# Patient Record
Sex: FEMALE | Race: WHITE | NOT HISPANIC OR LATINO | Employment: FULL TIME | URBAN - METROPOLITAN AREA
[De-identification: names, ages, dates, MRNs, and addresses within clinical notes are randomized per-mention and may not be internally consistent; named-entity substitution may affect disease eponyms.]

---

## 2019-05-25 ENCOUNTER — HOSPITAL ENCOUNTER (EMERGENCY)
Facility: HOSPITAL | Age: 64
Discharge: HOME | End: 2019-05-25
Attending: EMERGENCY MEDICINE | Admitting: EMERGENCY MEDICINE

## 2019-05-25 ENCOUNTER — APPOINTMENT (OUTPATIENT)
Dept: GENERAL RADIOLOGY | Facility: HOSPITAL | Age: 64
End: 2019-05-25

## 2019-05-25 VITALS
BODY MASS INDEX: 32.22 KG/M2 | TEMPERATURE: 99.3 F | HEIGHT: 68 IN | SYSTOLIC BLOOD PRESSURE: 228 MMHG | OXYGEN SATURATION: 95 % | DIASTOLIC BLOOD PRESSURE: 108 MMHG | RESPIRATION RATE: 16 BRPM | HEART RATE: 75 BPM | WEIGHT: 212.56 LBS

## 2019-05-25 DIAGNOSIS — M19.032 ARTHRITIS OF LEFT WRIST: Primary | ICD-10-CM

## 2019-05-25 LAB
ALBUMIN SERPL-MCNC: 4.2 G/DL (ref 3.5–5.2)
ALBUMIN/GLOB SERPL: 1.3 G/DL
ALP SERPL-CCNC: 78 U/L (ref 39–117)
ALT SERPL W P-5'-P-CCNC: 29 U/L (ref 1–33)
ANION GAP SERPL CALCULATED.3IONS-SCNC: 17.3 MMOL/L
AST SERPL-CCNC: 25 U/L (ref 1–32)
BASOPHILS # BLD AUTO: 0.07 10*3/MM3 (ref 0–0.2)
BASOPHILS NFR BLD AUTO: 0.5 % (ref 0–1.5)
BILIRUB SERPL-MCNC: 0.6 MG/DL (ref 0.2–1.2)
BUN BLD-MCNC: 13 MG/DL (ref 8–23)
BUN/CREAT SERPL: 17.8 (ref 7–25)
CALCIUM SPEC-SCNC: 9.8 MG/DL (ref 8.6–10.5)
CHLORIDE SERPL-SCNC: 97 MMOL/L (ref 98–107)
CO2 SERPL-SCNC: 24.7 MMOL/L (ref 22–29)
CREAT BLD-MCNC: 0.73 MG/DL (ref 0.57–1)
DEPRECATED RDW RBC AUTO: 46.2 FL (ref 37–54)
EOSINOPHIL # BLD AUTO: 0.06 10*3/MM3 (ref 0–0.4)
EOSINOPHIL NFR BLD AUTO: 0.4 % (ref 0.3–6.2)
ERYTHROCYTE [DISTWIDTH] IN BLOOD BY AUTOMATED COUNT: 13.2 % (ref 12.3–15.4)
ERYTHROCYTE [SEDIMENTATION RATE] IN BLOOD: 1 MM/HR (ref 0–30)
GFR SERPL CREATININE-BSD FRML MDRD: 80 ML/MIN/1.73
GLOBULIN UR ELPH-MCNC: 3.3 GM/DL
GLUCOSE BLD-MCNC: 121 MG/DL (ref 65–99)
HCT VFR BLD AUTO: 50 % (ref 34–46.6)
HGB BLD-MCNC: 16.3 G/DL (ref 12–15.9)
IMM GRANULOCYTES # BLD AUTO: 0.04 10*3/MM3 (ref 0–0.05)
IMM GRANULOCYTES NFR BLD AUTO: 0.3 % (ref 0–0.5)
LYMPHOCYTES # BLD AUTO: 2.47 10*3/MM3 (ref 0.7–3.1)
LYMPHOCYTES NFR BLD AUTO: 17.1 % (ref 19.6–45.3)
MCH RBC QN AUTO: 30.8 PG (ref 26.6–33)
MCHC RBC AUTO-ENTMCNC: 32.6 G/DL (ref 31.5–35.7)
MCV RBC AUTO: 94.3 FL (ref 79–97)
MONOCYTES # BLD AUTO: 1.04 10*3/MM3 (ref 0.1–0.9)
MONOCYTES NFR BLD AUTO: 7.2 % (ref 5–12)
NEUTROPHILS # BLD AUTO: 10.8 10*3/MM3 (ref 1.7–7)
NEUTROPHILS NFR BLD AUTO: 74.5 % (ref 42.7–76)
NRBC BLD AUTO-RTO: 0 /100 WBC (ref 0–0.2)
PLATELET # BLD AUTO: 300 10*3/MM3 (ref 140–450)
PMV BLD AUTO: 9.7 FL (ref 6–12)
POTASSIUM BLD-SCNC: 3.7 MMOL/L (ref 3.5–5.2)
PROT SERPL-MCNC: 7.5 G/DL (ref 6–8.5)
RBC # BLD AUTO: 5.3 10*6/MM3 (ref 3.77–5.28)
SODIUM BLD-SCNC: 139 MMOL/L (ref 136–145)
URATE SERPL-MCNC: 4.8 MG/DL (ref 2.4–5.7)
WBC NRBC COR # BLD: 14.48 10*3/MM3 (ref 3.4–10.8)

## 2019-05-25 PROCEDURE — 85652 RBC SED RATE AUTOMATED: CPT | Performed by: PHYSICIAN ASSISTANT

## 2019-05-25 PROCEDURE — 99283 EMERGENCY DEPT VISIT LOW MDM: CPT

## 2019-05-25 PROCEDURE — 73110 X-RAY EXAM OF WRIST: CPT

## 2019-05-25 PROCEDURE — 84550 ASSAY OF BLOOD/URIC ACID: CPT | Performed by: PHYSICIAN ASSISTANT

## 2019-05-25 PROCEDURE — 96374 THER/PROPH/DIAG INJ IV PUSH: CPT

## 2019-05-25 PROCEDURE — 80053 COMPREHEN METABOLIC PANEL: CPT | Performed by: PHYSICIAN ASSISTANT

## 2019-05-25 PROCEDURE — 85025 COMPLETE CBC W/AUTO DIFF WBC: CPT | Performed by: PHYSICIAN ASSISTANT

## 2019-05-25 RX ORDER — PREDNISONE 20 MG/1
20 TABLET ORAL 2 TIMES DAILY
Qty: 10 TABLET | Refills: 0 | Status: SHIPPED | OUTPATIENT
Start: 2019-05-25 | End: 2021-09-02

## 2019-05-25 RX ORDER — ONDANSETRON 4 MG/1
4 TABLET, FILM COATED ORAL EVERY 8 HOURS PRN
Qty: 15 TABLET | Refills: 0 | Status: SHIPPED | OUTPATIENT
Start: 2019-05-25 | End: 2021-09-02

## 2019-05-25 RX ORDER — CEPHALEXIN 500 MG/1
500 CAPSULE ORAL 4 TIMES DAILY
Qty: 40 CAPSULE | Refills: 0 | Status: SHIPPED | OUTPATIENT
Start: 2019-05-25 | End: 2021-09-02

## 2019-05-25 RX ORDER — SODIUM CHLORIDE 0.9 % (FLUSH) 0.9 %
10 SYRINGE (ML) INJECTION AS NEEDED
Status: DISCONTINUED | OUTPATIENT
Start: 2019-05-25 | End: 2019-05-25 | Stop reason: HOSPADM

## 2019-05-25 RX ORDER — HYDROCODONE BITARTRATE AND ACETAMINOPHEN 5; 325 MG/1; MG/1
1 TABLET ORAL EVERY 6 HOURS PRN
Qty: 15 TABLET | Refills: 0 | Status: SHIPPED | OUTPATIENT
Start: 2019-05-25 | End: 2021-09-02

## 2019-05-25 RX ADMIN — METOPROLOL TARTRATE 5 MG: 5 INJECTION INTRAVENOUS at 12:52

## 2021-06-29 ENCOUNTER — OFFICE VISIT (OUTPATIENT)
Dept: ORTHOPEDIC SURGERY | Facility: CLINIC | Age: 66
End: 2021-06-29

## 2021-06-29 VITALS — WEIGHT: 200 LBS | HEIGHT: 68 IN | TEMPERATURE: 96.8 F | BODY MASS INDEX: 30.31 KG/M2

## 2021-06-29 DIAGNOSIS — M16.11 PRIMARY OSTEOARTHRITIS OF RIGHT HIP: Primary | ICD-10-CM

## 2021-06-29 PROCEDURE — 99204 OFFICE O/P NEW MOD 45 MIN: CPT | Performed by: ORTHOPAEDIC SURGERY

## 2021-06-29 RX ORDER — CHLORHEXIDINE GLUCONATE 500 MG/1
CLOTH TOPICAL 2 TIMES DAILY
Status: CANCELLED | OUTPATIENT
Start: 2021-06-29

## 2021-06-29 RX ORDER — FLUOXETINE HYDROCHLORIDE 20 MG/1
20 CAPSULE ORAL DAILY
COMMUNITY
Start: 2021-06-08

## 2021-06-29 RX ORDER — PREGABALIN 75 MG/1
150 CAPSULE ORAL ONCE
Status: CANCELLED | OUTPATIENT
Start: 2021-09-17 | End: 2021-06-29

## 2021-06-29 RX ORDER — CEFAZOLIN SODIUM 2 G/100ML
2 INJECTION, SOLUTION INTRAVENOUS ONCE
Status: CANCELLED | OUTPATIENT
Start: 2021-09-17 | End: 2021-06-29

## 2021-06-29 RX ORDER — MELOXICAM 15 MG/1
15 TABLET ORAL ONCE
Status: CANCELLED | OUTPATIENT
Start: 2021-09-17 | End: 2021-06-29

## 2021-06-29 NOTE — PROGRESS NOTES
"Patient: Fariba Gauthier  YOB: 1955 66 y.o. female  Medical Record Number: [id number]    Chief Complaints:   Chief Complaint   Patient presents with   • Right Hip - Initial Evaluation       History of Present Illness:Fariba Gauthier is a 66 y.o. female who presents with  Right hip and groin pain - severe stabbing pain - limits adls, cant walk more than a block due to pain. Has progressed markedly over the past year.    Allergies:   Allergies   Allergen Reactions   • Morphine And Related        Medications:   Current Outpatient Medications   Medication Sig Dispense Refill   • cephalexin (KEFLEX) 500 MG capsule Take 1 capsule by mouth 4 (Four) Times a Day. 40 capsule 0   • FLUoxetine (PROzac) 20 MG capsule      • HYDROcodone-acetaminophen (NORCO) 5-325 MG per tablet Take 1 tablet by mouth Every 6 (Six) Hours As Needed for Severe Pain . 15 tablet 0   • LISINOPRIL PO Take  by mouth.     • metoprolol succinate XL (TOPROL-XL) 25 MG 24 hr tablet Take 25 mg by mouth Daily.     • ondansetron (ZOFRAN) 4 MG tablet Take 1 tablet by mouth Every 8 (Eight) Hours As Needed for Nausea or Vomiting. 15 tablet 0   • predniSONE (DELTASONE) 20 MG tablet Take 1 tablet by mouth 2 (Two) Times a Day. 10 tablet 0     No current facility-administered medications for this visit.         The following portions of the patient's history were reviewed and updated as appropriate: allergies, current medications, past family history, past medical history, past social history, past surgical history and problem list.    Review of Systems:   A 14 point review of systems was performed. All systems negative except pertinent positives/negative listed in HPI above    Physical Exam:   Vitals:    06/29/21 0814   Temp: 96.8 °F (36 °C)   TempSrc: Temporal   Weight: 90.7 kg (200 lb)   Height: 172.7 cm (68\")       General: A and O x 3, ASA, NAD    SCLERA:    Normal    DENTITION:   Normal  Hip:  right    LEG ALIGNMENT:     Neutral        LEG LENGTH " DISCREPANCY   :    left longer by 0.5 cm    GAIT:     Antalgic    SKIN:     No abnormality    RANGE OF MOTION:     Limited by joint irritability    STRENGTH:     Limited by joint irratibility    DISTAL PULSES:    Paplable    DISTAL SENSATION :   Intact    LYMPHATICS:     No   lymphadenopathy    OTHER:          +   Stinchfeld test      -    log roll      -   Tenderness to palpation trochanteric bursa        Radiology:  Xrays 2views right hip  (AP bilateral hips, and lateral of the hip) taken at an [facility] institution were reviewed  demonstrating  advanced, end-satge osteoarthritis with bone on bone articluation, periarticular osteophytes, and subchondral cysts    Assessment/Plan: Right hip endstage OA.  Continuation of conservative management vs. BEHZAD discussed.  The patient wishes to proceed with total hip replacement.  At this point the patient has failed the full gamut of conservative treatment and stating complete understanding of the risks/benefits/ anternatives wishes to proceed with surgical treatment.    Risk and benefits of surgery were reviewed.  Including, but not limited to, blood clots, anesthesia risk, infection, leg length discrepancy, fracture, skin/leg numbness, failure of the implant, need for future surgeries, continued pain, hematoma, need for transfusion, and death, among others.  The patient understands and wishes to proceed.     The spectrum of treatment options were discussed with the patient in detail including both the nonoperative and operative treatment modalities and their respective risks and benefits.  After thorough discussion, the patient has elected to undergo surgical treatment.  The details of the surgical procedure were explained including the location of probable incisions and a description of the likely implants to be used.  Models and diagrams were used as educational resources. The patient understands the likely convalescence after surgery, as well as the rehabilitation  required.  We thoroughly discussed the risks, benefits, and alternatives to surgery.  The risks include but are not limited to the risk of infection, joint stiffness, blood clots (including DVT and/or pulmonary embolus along with the risk of death), neurologic and/or vascular injury, fracture, dislocation, nonunion, malunion, need for further surgery including hardware failure requiring revision, and continued pain.  It was explained that if tissue has been repaired or reconstructed, there is also a chance of failure which may require further management.  Following the completion of the discussion, the patient expressed understanding of this planned course of care, all their questions were answered and consent will be obtained preoperatively.    Operative Plan: Posterior approach Total Hip Replacement 23 HR STAY      Ronald Maldonado MD  6/29/2021

## 2021-08-16 ENCOUNTER — TELEPHONE (OUTPATIENT)
Dept: ORTHOPEDIC SURGERY | Facility: CLINIC | Age: 66
End: 2021-08-16

## 2021-08-27 ENCOUNTER — TRANSCRIBE ORDERS (OUTPATIENT)
Dept: PREADMISSION TESTING | Facility: HOSPITAL | Age: 66
End: 2021-08-27

## 2021-08-27 DIAGNOSIS — Z01.818 OTHER SPECIFIED PRE-OPERATIVE EXAMINATION: Primary | ICD-10-CM

## 2021-09-02 ENCOUNTER — PRE-ADMISSION TESTING (OUTPATIENT)
Dept: PREADMISSION TESTING | Facility: HOSPITAL | Age: 66
End: 2021-09-02

## 2021-09-02 VITALS
HEIGHT: 68 IN | HEART RATE: 93 BPM | OXYGEN SATURATION: 96 % | SYSTOLIC BLOOD PRESSURE: 138 MMHG | DIASTOLIC BLOOD PRESSURE: 92 MMHG | RESPIRATION RATE: 16 BRPM | WEIGHT: 200 LBS | TEMPERATURE: 97.8 F | BODY MASS INDEX: 30.31 KG/M2

## 2021-09-02 DIAGNOSIS — M16.11 PRIMARY OSTEOARTHRITIS OF RIGHT HIP: ICD-10-CM

## 2021-09-02 LAB
ANION GAP SERPL CALCULATED.3IONS-SCNC: 12.1 MMOL/L (ref 5–15)
BACTERIA UR QL AUTO: ABNORMAL /HPF
BILIRUB UR QL STRIP: NEGATIVE
BUN SERPL-MCNC: 17 MG/DL (ref 8–23)
BUN/CREAT SERPL: 21.5 (ref 7–25)
CALCIUM SPEC-SCNC: 9.5 MG/DL (ref 8.6–10.5)
CHLORIDE SERPL-SCNC: 101 MMOL/L (ref 98–107)
CLARITY UR: ABNORMAL
CO2 SERPL-SCNC: 25.9 MMOL/L (ref 22–29)
COLOR UR: ABNORMAL
CREAT SERPL-MCNC: 0.79 MG/DL (ref 0.57–1)
DEPRECATED RDW RBC AUTO: 45.8 FL (ref 37–54)
ERYTHROCYTE [DISTWIDTH] IN BLOOD BY AUTOMATED COUNT: 13.3 % (ref 12.3–15.4)
GFR SERPL CREATININE-BSD FRML MDRD: 73 ML/MIN/1.73
GLUCOSE SERPL-MCNC: 113 MG/DL (ref 65–99)
GLUCOSE UR STRIP-MCNC: NEGATIVE MG/DL
HCT VFR BLD AUTO: 51 % (ref 34–46.6)
HGB BLD-MCNC: 17.3 G/DL (ref 12–15.9)
HGB UR QL STRIP.AUTO: NEGATIVE
HYALINE CASTS UR QL AUTO: ABNORMAL /LPF
KETONES UR QL STRIP: ABNORMAL
LEUKOCYTE ESTERASE UR QL STRIP.AUTO: ABNORMAL
MCH RBC QN AUTO: 31.7 PG (ref 26.6–33)
MCHC RBC AUTO-ENTMCNC: 33.9 G/DL (ref 31.5–35.7)
MCV RBC AUTO: 93.6 FL (ref 79–97)
NITRITE UR QL STRIP: NEGATIVE
PH UR STRIP.AUTO: 5.5 [PH] (ref 5–8)
PLATELET # BLD AUTO: 399 10*3/MM3 (ref 140–450)
PMV BLD AUTO: 9.4 FL (ref 6–12)
POTASSIUM SERPL-SCNC: 3.6 MMOL/L (ref 3.5–5.2)
PROT UR QL STRIP: ABNORMAL
QT INTERVAL: 397 MS
RBC # BLD AUTO: 5.45 10*6/MM3 (ref 3.77–5.28)
RBC # UR: ABNORMAL /HPF
REF LAB TEST METHOD: ABNORMAL
SODIUM SERPL-SCNC: 139 MMOL/L (ref 136–145)
SP GR UR STRIP: 1.02 (ref 1–1.03)
SQUAMOUS #/AREA URNS HPF: ABNORMAL /HPF
UROBILINOGEN UR QL STRIP: ABNORMAL
WBC # BLD AUTO: 10.12 10*3/MM3 (ref 3.4–10.8)
WBC UR QL AUTO: ABNORMAL /HPF

## 2021-09-02 PROCEDURE — 80048 BASIC METABOLIC PNL TOTAL CA: CPT

## 2021-09-02 PROCEDURE — 93010 ELECTROCARDIOGRAM REPORT: CPT | Performed by: INTERNAL MEDICINE

## 2021-09-02 PROCEDURE — 81001 URINALYSIS AUTO W/SCOPE: CPT

## 2021-09-02 PROCEDURE — 93005 ELECTROCARDIOGRAM TRACING: CPT

## 2021-09-02 PROCEDURE — 36415 COLL VENOUS BLD VENIPUNCTURE: CPT

## 2021-09-02 PROCEDURE — 85027 COMPLETE CBC AUTOMATED: CPT

## 2021-09-02 RX ORDER — CHLORHEXIDINE GLUCONATE 500 MG/1
CLOTH TOPICAL 2 TIMES DAILY
Status: ACTIVE | OUTPATIENT
Start: 2021-09-02

## 2021-09-02 RX ORDER — CHLORAL HYDRATE 500 MG
CAPSULE ORAL
COMMUNITY
End: 2021-09-17 | Stop reason: HOSPADM

## 2021-09-02 RX ORDER — CETIRIZINE HYDROCHLORIDE 10 MG/1
10 TABLET ORAL DAILY
COMMUNITY

## 2021-09-02 RX ORDER — METOPROLOL SUCCINATE 100 MG/1
100 TABLET, EXTENDED RELEASE ORAL DAILY
COMMUNITY

## 2021-09-02 ASSESSMENT — HOOS JR
HOOS JR SCORE: 14
HOOS JR SCORE: 46.652

## 2021-09-02 NOTE — DISCHARGE INSTRUCTIONS

## 2021-09-09 ENCOUNTER — OFFICE VISIT (OUTPATIENT)
Dept: ORTHOPEDIC SURGERY | Facility: CLINIC | Age: 66
End: 2021-09-09

## 2021-09-09 VITALS
WEIGHT: 201.2 LBS | HEIGHT: 68 IN | BODY MASS INDEX: 30.49 KG/M2 | TEMPERATURE: 98 F | DIASTOLIC BLOOD PRESSURE: 94 MMHG | SYSTOLIC BLOOD PRESSURE: 142 MMHG

## 2021-09-09 DIAGNOSIS — R52 PAIN: Primary | ICD-10-CM

## 2021-09-09 PROCEDURE — 73502 X-RAY EXAM HIP UNI 2-3 VIEWS: CPT | Performed by: NURSE PRACTITIONER

## 2021-09-09 PROCEDURE — S0260 H&P FOR SURGERY: HCPCS | Performed by: NURSE PRACTITIONER

## 2021-09-09 RX ORDER — LISINOPRIL AND HYDROCHLOROTHIAZIDE 20; 12.5 MG/1; MG/1
TABLET ORAL
COMMUNITY
Start: 2021-07-07

## 2021-09-09 NOTE — H&P (VIEW-ONLY)
"Patient: Fariba Gauthier    Date of Admission: 9/17/2021    YOB: 1955    Medical Record Number: [id number]    Admitting Physician: Dr. Ronald Maldonado    Reason for Admission: End Stage Right Hip OA    History of Present Illness: 66 y.o. female presents with severe end stage hip osteoarthritis which has not been responsive to the full compliment of conservative measures. Despite conservative attempts, there is still severe, constant activity limiting hip pain. Given the severity of the pain, the patient has elected to proceed with hip replacement.    Allergies:   Allergies   Allergen Reactions   • Morphine And Related Anaphylaxis         Current Medications:  Home Medications:    Current Outpatient Medications on File Prior to Visit   Medication Sig   • cetirizine (zyrTEC) 10 MG tablet Take 10 mg by mouth Daily.   • Chlorhexidine Gluconate (HIBICLENS EX) Apply  topically. AS DIRECTED PREOP   • FLUoxetine (PROzac) 20 MG capsule Take 20 mg by mouth Daily.   • lisinopril 20 MG tablet 20 mg, hydroCHLOROthiazide 12.5 MG 12.5 mg Take 1 dose by mouth Daily.   • lisinopril-hydrochlorothiazide (PRINZIDE,ZESTORETIC) 20-12.5 MG per tablet    • metoprolol succinate XL (TOPROL-XL) 100 MG 24 hr tablet Take 100 mg by mouth Daily.   • mupirocin (BACTROBAN) 2 % ointment Apply  topically to the appropriate area as directed 3 (Three) Times a Day. AS DIRECTED PREOP   • Omega-3 Fatty Acids (fish oil) 1000 MG capsule capsule Take  by mouth Daily With Breakfast. PT TO STOP PER MD INSTRUCTION     Current Facility-Administered Medications on File Prior to Visit   Medication   • Chlorhexidine Gluconate Cloth 2 % pads     PRN Meds:.    PMH:  Past Medical History:   Diagnosis Date   • Anesthesia complication     PT WANTS EVERYONE TO KNOW SHE \"GETS MEAN\" AFTER   • Depression with anxiety    • Hypertension    • Seasonal allergies         PSURGH:  Past Surgical History:   Procedure Laterality Date   • ANKLE LIGAMENT RECONSTRUCTION     • " "COLONOSCOPY     • OVARIAN CYST REMOVAL         SocialHx:  Social History     Occupational History   • Not on file   Tobacco Use   • Smoking status: Current Every Day Smoker     Packs/day: 0.50     Types: Cigarettes   • Smokeless tobacco: Never Used   Vaping Use   • Vaping Use: Never used   Substance and Sexual Activity   • Alcohol use: Yes     Alcohol/week: 8.0 standard drinks     Types: 8 Shots of liquor per week   • Drug use: Not Currently   • Sexual activity: Not on file      Social History     Social History Narrative   • Not on file       FamHx:  Family History   Problem Relation Age of Onset   • Malig Hyperthermia Neg Hx          Review of Systems:   A 14 point review of systems was performed, pertinent positives discussed above, all other systems are negative    Physical Exam: 66 y.o. female  Vital Signs :   Vitals:    09/09/21 1350   BP: 142/94   BP Location: Right arm   Patient Position: Sitting   Cuff Size: Adult   Temp: 98 °F (36.7 °C)   TempSrc: Temporal   Weight: 91.3 kg (201 lb 3.2 oz)   Height: 172.7 cm (68\")     General: Alert and Oriented x 3, No acute distress.  Psych: mood and affect appropriate; recent and remote memory intact  Eyes: conjunctiva clear; pupils equally round and reactive, sclera nonicteric  CV: no peripheral edema  Resp: normal respiratory effort  Skin: no rashes or wounds; normal turgor  Musculosketetal; pain with hip range of motion. Positive stinchfeld test. No trochanteric  Tenderness.  Vascular: palpable distal pulses    Labs:    Pre-Admission Testing on 09/02/2021   Component Date Value Ref Range Status   • Color, UA 09/02/2021 Dark Yellow* Yellow, Straw Final   • Appearance, UA 09/02/2021 Cloudy* Clear Final   • pH, UA 09/02/2021 5.5  5.0 - 8.0 Final   • Specific Gravity, UA 09/02/2021 1.019  1.005 - 1.030 Final   • Glucose, UA 09/02/2021 Negative  Negative Final   • Ketones, UA 09/02/2021 Trace* Negative Final   • Bilirubin, UA 09/02/2021 Negative  Negative Final   • Blood, " UA 09/02/2021 Negative  Negative Final   • Protein, UA 09/02/2021 100 mg/dL (2+)* Negative Final   • Leuk Esterase, UA 09/02/2021 Trace* Negative Final   • Nitrite, UA 09/02/2021 Negative  Negative Final   • Urobilinogen, UA 09/02/2021 1.0 E.U./dL  0.2 - 1.0 E.U./dL Final   • Glucose 09/02/2021 113* 65 - 99 mg/dL Final   • BUN 09/02/2021 17  8 - 23 mg/dL Final   • Creatinine 09/02/2021 0.79  0.57 - 1.00 mg/dL Final   • Sodium 09/02/2021 139  136 - 145 mmol/L Final   • Potassium 09/02/2021 3.6  3.5 - 5.2 mmol/L Final   • Chloride 09/02/2021 101  98 - 107 mmol/L Final   • CO2 09/02/2021 25.9  22.0 - 29.0 mmol/L Final   • Calcium 09/02/2021 9.5  8.6 - 10.5 mg/dL Final   • eGFR Non  Amer 09/02/2021 73  >60 mL/min/1.73 Final   • BUN/Creatinine Ratio 09/02/2021 21.5  7.0 - 25.0 Final   • Anion Gap 09/02/2021 12.1  5.0 - 15.0 mmol/L Final   • WBC 09/02/2021 10.12  3.40 - 10.80 10*3/mm3 Final   • RBC 09/02/2021 5.45* 3.77 - 5.28 10*6/mm3 Final   • Hemoglobin 09/02/2021 17.3* 12.0 - 15.9 g/dL Final   • Hematocrit 09/02/2021 51.0* 34.0 - 46.6 % Final   • MCV 09/02/2021 93.6  79.0 - 97.0 fL Final   • MCH 09/02/2021 31.7  26.6 - 33.0 pg Final   • MCHC 09/02/2021 33.9  31.5 - 35.7 g/dL Final   • RDW 09/02/2021 13.3  12.3 - 15.4 % Final   • RDW-SD 09/02/2021 45.8  37.0 - 54.0 fl Final   • MPV 09/02/2021 9.4  6.0 - 12.0 fL Final   • Platelets 09/02/2021 399  140 - 450 10*3/mm3 Final   • QT Interval 09/02/2021 397  ms Final   • RBC, UA 09/02/2021 0-2  None Seen, 0-2 /HPF Final   • WBC, UA 09/02/2021 0-2  None Seen, 0-2 /HPF Final   • Bacteria, UA 09/02/2021 None Seen  None Seen /HPF Final   • Squamous Epithelial Cells, UA 09/02/2021 21-30* None Seen, 0-2 /HPF Final   • Hyaline Casts, UA 09/02/2021 0-2  None Seen /LPF Final   • Methodology 09/02/2021 Manual Light Microscopy   Final     Xrays:  Xrays AP pelvis and a lateral of the Right hip were ordered and reviewed demonstrating  End stage hip OA with bone on bone  articulation, subchondral cysts and periarticular osteophytes.    Assessment:  End-stage Right hip osteoarthritis. Conservative measures have failed.      Plan:  The plan is to proceed with Right Total Hip Replacement. The patient voiced understanding of the risks, benefits, and alternative forms of treatment that were discussed with Dr Maldonado at the time of scheduling.  Same day home health    Camila Reilly, APRN  9/9/2021

## 2021-09-09 NOTE — H&P
"Patient: Fariba Gauthier    Date of Admission: 9/17/2021    YOB: 1955    Medical Record Number: [id number]    Admitting Physician: Dr. Ronald Maldonado    Reason for Admission: End Stage Right Hip OA    History of Present Illness: 66 y.o. female presents with severe end stage hip osteoarthritis which has not been responsive to the full compliment of conservative measures. Despite conservative attempts, there is still severe, constant activity limiting hip pain. Given the severity of the pain, the patient has elected to proceed with hip replacement.    Allergies:   Allergies   Allergen Reactions   • Morphine And Related Anaphylaxis         Current Medications:  Home Medications:    Current Outpatient Medications on File Prior to Visit   Medication Sig   • cetirizine (zyrTEC) 10 MG tablet Take 10 mg by mouth Daily.   • Chlorhexidine Gluconate (HIBICLENS EX) Apply  topically. AS DIRECTED PREOP   • FLUoxetine (PROzac) 20 MG capsule Take 20 mg by mouth Daily.   • lisinopril 20 MG tablet 20 mg, hydroCHLOROthiazide 12.5 MG 12.5 mg Take 1 dose by mouth Daily.   • lisinopril-hydrochlorothiazide (PRINZIDE,ZESTORETIC) 20-12.5 MG per tablet    • metoprolol succinate XL (TOPROL-XL) 100 MG 24 hr tablet Take 100 mg by mouth Daily.   • mupirocin (BACTROBAN) 2 % ointment Apply  topically to the appropriate area as directed 3 (Three) Times a Day. AS DIRECTED PREOP   • Omega-3 Fatty Acids (fish oil) 1000 MG capsule capsule Take  by mouth Daily With Breakfast. PT TO STOP PER MD INSTRUCTION     Current Facility-Administered Medications on File Prior to Visit   Medication   • Chlorhexidine Gluconate Cloth 2 % pads     PRN Meds:.    PMH:  Past Medical History:   Diagnosis Date   • Anesthesia complication     PT WANTS EVERYONE TO KNOW SHE \"GETS MEAN\" AFTER   • Depression with anxiety    • Hypertension    • Seasonal allergies         PSURGH:  Past Surgical History:   Procedure Laterality Date   • ANKLE LIGAMENT RECONSTRUCTION     • " "COLONOSCOPY     • OVARIAN CYST REMOVAL         SocialHx:  Social History     Occupational History   • Not on file   Tobacco Use   • Smoking status: Current Every Day Smoker     Packs/day: 0.50     Types: Cigarettes   • Smokeless tobacco: Never Used   Vaping Use   • Vaping Use: Never used   Substance and Sexual Activity   • Alcohol use: Yes     Alcohol/week: 8.0 standard drinks     Types: 8 Shots of liquor per week   • Drug use: Not Currently   • Sexual activity: Not on file      Social History     Social History Narrative   • Not on file       FamHx:  Family History   Problem Relation Age of Onset   • Malig Hyperthermia Neg Hx          Review of Systems:   A 14 point review of systems was performed, pertinent positives discussed above, all other systems are negative    Physical Exam: 66 y.o. female  Vital Signs :   Vitals:    09/09/21 1350   BP: 142/94   BP Location: Right arm   Patient Position: Sitting   Cuff Size: Adult   Temp: 98 °F (36.7 °C)   TempSrc: Temporal   Weight: 91.3 kg (201 lb 3.2 oz)   Height: 172.7 cm (68\")     General: Alert and Oriented x 3, No acute distress.  Psych: mood and affect appropriate; recent and remote memory intact  Eyes: conjunctiva clear; pupils equally round and reactive, sclera nonicteric  CV: no peripheral edema  Resp: normal respiratory effort  Skin: no rashes or wounds; normal turgor  Musculosketetal; pain with hip range of motion. Positive stinchfeld test. No trochanteric  Tenderness.  Vascular: palpable distal pulses    Labs:    Pre-Admission Testing on 09/02/2021   Component Date Value Ref Range Status   • Color, UA 09/02/2021 Dark Yellow* Yellow, Straw Final   • Appearance, UA 09/02/2021 Cloudy* Clear Final   • pH, UA 09/02/2021 5.5  5.0 - 8.0 Final   • Specific Gravity, UA 09/02/2021 1.019  1.005 - 1.030 Final   • Glucose, UA 09/02/2021 Negative  Negative Final   • Ketones, UA 09/02/2021 Trace* Negative Final   • Bilirubin, UA 09/02/2021 Negative  Negative Final   • Blood, " UA 09/02/2021 Negative  Negative Final   • Protein, UA 09/02/2021 100 mg/dL (2+)* Negative Final   • Leuk Esterase, UA 09/02/2021 Trace* Negative Final   • Nitrite, UA 09/02/2021 Negative  Negative Final   • Urobilinogen, UA 09/02/2021 1.0 E.U./dL  0.2 - 1.0 E.U./dL Final   • Glucose 09/02/2021 113* 65 - 99 mg/dL Final   • BUN 09/02/2021 17  8 - 23 mg/dL Final   • Creatinine 09/02/2021 0.79  0.57 - 1.00 mg/dL Final   • Sodium 09/02/2021 139  136 - 145 mmol/L Final   • Potassium 09/02/2021 3.6  3.5 - 5.2 mmol/L Final   • Chloride 09/02/2021 101  98 - 107 mmol/L Final   • CO2 09/02/2021 25.9  22.0 - 29.0 mmol/L Final   • Calcium 09/02/2021 9.5  8.6 - 10.5 mg/dL Final   • eGFR Non  Amer 09/02/2021 73  >60 mL/min/1.73 Final   • BUN/Creatinine Ratio 09/02/2021 21.5  7.0 - 25.0 Final   • Anion Gap 09/02/2021 12.1  5.0 - 15.0 mmol/L Final   • WBC 09/02/2021 10.12  3.40 - 10.80 10*3/mm3 Final   • RBC 09/02/2021 5.45* 3.77 - 5.28 10*6/mm3 Final   • Hemoglobin 09/02/2021 17.3* 12.0 - 15.9 g/dL Final   • Hematocrit 09/02/2021 51.0* 34.0 - 46.6 % Final   • MCV 09/02/2021 93.6  79.0 - 97.0 fL Final   • MCH 09/02/2021 31.7  26.6 - 33.0 pg Final   • MCHC 09/02/2021 33.9  31.5 - 35.7 g/dL Final   • RDW 09/02/2021 13.3  12.3 - 15.4 % Final   • RDW-SD 09/02/2021 45.8  37.0 - 54.0 fl Final   • MPV 09/02/2021 9.4  6.0 - 12.0 fL Final   • Platelets 09/02/2021 399  140 - 450 10*3/mm3 Final   • QT Interval 09/02/2021 397  ms Final   • RBC, UA 09/02/2021 0-2  None Seen, 0-2 /HPF Final   • WBC, UA 09/02/2021 0-2  None Seen, 0-2 /HPF Final   • Bacteria, UA 09/02/2021 None Seen  None Seen /HPF Final   • Squamous Epithelial Cells, UA 09/02/2021 21-30* None Seen, 0-2 /HPF Final   • Hyaline Casts, UA 09/02/2021 0-2  None Seen /LPF Final   • Methodology 09/02/2021 Manual Light Microscopy   Final     Xrays:  Xrays AP pelvis and a lateral of the Right hip were ordered and reviewed demonstrating  End stage hip OA with bone on bone  articulation, subchondral cysts and periarticular osteophytes.    Assessment:  End-stage Right hip osteoarthritis. Conservative measures have failed.      Plan:  The plan is to proceed with Right Total Hip Replacement. The patient voiced understanding of the risks, benefits, and alternative forms of treatment that were discussed with Dr Maldonado at the time of scheduling.  Same day home health    Camila Reilly, APRN  9/9/2021

## 2021-09-13 ENCOUNTER — TELEPHONE (OUTPATIENT)
Dept: ORTHOPEDIC SURGERY | Facility: HOSPITAL | Age: 66
End: 2021-09-13

## 2021-09-13 NOTE — TELEPHONE ENCOUNTER
Pain Risk:  ? Currently on narcotics  ? ETOH > 3 drinks/day  ? Pain management patient  ? Current cannaboid use  No issues  Cardiac-Neuro Risk:  ? Arrhythmias  ? Stent/MI  ? Pacer  ? Heart Failure  ? Stroke with residual Choose an item.  htn  Respiratory Risk:  ? Sleep apnea  ? CPAP machine use  ? Nightly snoring  ? Asthma/COPD  Diabetic Risk:  HgA1C (within 90 days prior to PAT):  Click or tap here to enter text.  ? Insulin use  ? More than 1 diabetic medication  No issues  Urinary retention:  No  Caregiver 24-48hrs post-discharge: staying with brother    Home needs:  ? None/will need before discharge  ? Have walker and/or cane  ? Steps 1 step to get in the side door   Discharge Plan:  Home with      Prescriptions: Meds to bed    Home medications:   ? Blood thinner/anti-coag therapy  ? BPH or diuretic- Flomax  ? BP meds  Metoprolol, Lisinopril/hctz      Educate patient on spinal anesthesia/pain control:  ? patient verbalize understanding    Educate patient on hospital course/timeline:  ?  patient verbalize understanding    Joint Care Class:  ?  yes ? no

## 2021-09-15 ENCOUNTER — LAB (OUTPATIENT)
Dept: LAB | Facility: HOSPITAL | Age: 66
End: 2021-09-15

## 2021-09-15 DIAGNOSIS — Z01.818 OTHER SPECIFIED PRE-OPERATIVE EXAMINATION: ICD-10-CM

## 2021-09-15 LAB — SARS-COV-2 ORF1AB RESP QL NAA+PROBE: NOT DETECTED

## 2021-09-15 PROCEDURE — U0004 COV-19 TEST NON-CDC HGH THRU: HCPCS

## 2021-09-15 PROCEDURE — U0005 INFEC AGEN DETEC AMPLI PROBE: HCPCS

## 2021-09-15 PROCEDURE — C9803 HOPD COVID-19 SPEC COLLECT: HCPCS

## 2021-09-17 ENCOUNTER — ANESTHESIA (OUTPATIENT)
Dept: PERIOP | Facility: HOSPITAL | Age: 66
End: 2021-09-17

## 2021-09-17 ENCOUNTER — APPOINTMENT (OUTPATIENT)
Dept: GENERAL RADIOLOGY | Facility: HOSPITAL | Age: 66
End: 2021-09-17

## 2021-09-17 ENCOUNTER — HOSPITAL ENCOUNTER (OUTPATIENT)
Facility: HOSPITAL | Age: 66
Discharge: HOME HEALTH | End: 2021-09-17
Attending: ORTHOPAEDIC SURGERY | Admitting: ORTHOPAEDIC SURGERY

## 2021-09-17 ENCOUNTER — ANESTHESIA EVENT (OUTPATIENT)
Dept: PERIOP | Facility: HOSPITAL | Age: 66
End: 2021-09-17

## 2021-09-17 ENCOUNTER — HOME HEALTH ADMISSION (OUTPATIENT)
Dept: HOME HEALTH SERVICES | Facility: HOME HEALTHCARE | Age: 66
End: 2021-09-17

## 2021-09-17 VITALS
HEIGHT: 68 IN | DIASTOLIC BLOOD PRESSURE: 89 MMHG | SYSTOLIC BLOOD PRESSURE: 174 MMHG | OXYGEN SATURATION: 96 % | BODY MASS INDEX: 30.24 KG/M2 | HEART RATE: 74 BPM | TEMPERATURE: 97.6 F | WEIGHT: 199.52 LBS | RESPIRATION RATE: 16 BRPM

## 2021-09-17 DIAGNOSIS — M16.11 PRIMARY OSTEOARTHRITIS OF RIGHT HIP: ICD-10-CM

## 2021-09-17 DIAGNOSIS — Z98.890 STATUS POST HIP SURGERY: Primary | ICD-10-CM

## 2021-09-17 LAB
ABO GROUP BLD: NORMAL
BLD GP AB SCN SERPL QL: NEGATIVE
RH BLD: POSITIVE
T&S EXPIRATION DATE: NORMAL

## 2021-09-17 PROCEDURE — 25010000002 HYDRALAZINE PER 20 MG: Performed by: NURSE ANESTHETIST, CERTIFIED REGISTERED

## 2021-09-17 PROCEDURE — 27130 TOTAL HIP ARTHROPLASTY: CPT | Performed by: NURSE PRACTITIONER

## 2021-09-17 PROCEDURE — C1776 JOINT DEVICE (IMPLANTABLE): HCPCS | Performed by: ORTHOPAEDIC SURGERY

## 2021-09-17 PROCEDURE — 25010000002 PROPOFOL 10 MG/ML EMULSION: Performed by: NURSE ANESTHETIST, CERTIFIED REGISTERED

## 2021-09-17 PROCEDURE — 25010000002 KETOROLAC TROMETHAMINE PER 15 MG

## 2021-09-17 PROCEDURE — C1889 IMPLANT/INSERT DEVICE, NOC: HCPCS | Performed by: ORTHOPAEDIC SURGERY

## 2021-09-17 PROCEDURE — 97110 THERAPEUTIC EXERCISES: CPT | Performed by: PHYSICAL THERAPIST

## 2021-09-17 PROCEDURE — 97161 PT EVAL LOW COMPLEX 20 MIN: CPT | Performed by: PHYSICAL THERAPIST

## 2021-09-17 PROCEDURE — G0378 HOSPITAL OBSERVATION PER HR: HCPCS

## 2021-09-17 PROCEDURE — 25010000002 HYDROMORPHONE PER 4 MG: Performed by: NURSE ANESTHETIST, CERTIFIED REGISTERED

## 2021-09-17 PROCEDURE — 25010000002 ROPIVACAINE PER 1 MG

## 2021-09-17 PROCEDURE — 86900 BLOOD TYPING SEROLOGIC ABO: CPT | Performed by: ORTHOPAEDIC SURGERY

## 2021-09-17 PROCEDURE — 25010000002 EPINEPHRINE 1 MG/ML SOLUTION

## 2021-09-17 PROCEDURE — 25010000002 ONDANSETRON PER 1 MG: Performed by: NURSE ANESTHETIST, CERTIFIED REGISTERED

## 2021-09-17 PROCEDURE — 73501 X-RAY EXAM HIP UNI 1 VIEW: CPT

## 2021-09-17 PROCEDURE — 86901 BLOOD TYPING SEROLOGIC RH(D): CPT | Performed by: ORTHOPAEDIC SURGERY

## 2021-09-17 PROCEDURE — 25010000003 CEFAZOLIN IN DEXTROSE 2-4 GM/100ML-% SOLUTION: Performed by: ORTHOPAEDIC SURGERY

## 2021-09-17 PROCEDURE — 25010000002 MIDAZOLAM PER 1 MG: Performed by: ANESTHESIOLOGY

## 2021-09-17 PROCEDURE — 27130 TOTAL HIP ARTHROPLASTY: CPT | Performed by: ORTHOPAEDIC SURGERY

## 2021-09-17 PROCEDURE — 25010000002 FENTANYL CITRATE (PF) 50 MCG/ML SOLUTION: Performed by: NURSE ANESTHETIST, CERTIFIED REGISTERED

## 2021-09-17 PROCEDURE — 25010000002 NEOSTIGMINE 5 MG/10ML SOLUTION: Performed by: NURSE ANESTHETIST, CERTIFIED REGISTERED

## 2021-09-17 PROCEDURE — 86850 RBC ANTIBODY SCREEN: CPT | Performed by: ORTHOPAEDIC SURGERY

## 2021-09-17 PROCEDURE — 25010000002 DEXAMETHASONE PER 1 MG: Performed by: NURSE ANESTHETIST, CERTIFIED REGISTERED

## 2021-09-17 DEVICE — R3 20 DEGREE XLPE ACETABULAR LINER                                    36MM INNER DIAMETER X OUTER DIAMETER 58MM
Type: IMPLANTABLE DEVICE | Site: HIP | Status: FUNCTIONAL
Brand: R3

## 2021-09-17 DEVICE — IMPLANTABLE DEVICE: Type: IMPLANTABLE DEVICE | Site: HIP | Status: FUNCTIONAL

## 2021-09-17 DEVICE — REFLECTION SPHERICAL HEAD SCREW 30MM
Type: IMPLANTABLE DEVICE | Site: HIP | Status: FUNCTIONAL
Brand: REFLECTION

## 2021-09-17 DEVICE — DEV CONTRL TISS STRATAFIXSPIRALMNCRYL PLSPS2 REV3/0 45CM: Type: IMPLANTABLE DEVICE | Site: HIP | Status: FUNCTIONAL

## 2021-09-17 DEVICE — OXINIUM FEMORAL HEAD 12/14 TAPER                                    36 MM +0
Type: IMPLANTABLE DEVICE | Site: HIP | Status: FUNCTIONAL
Brand: OXINIUM

## 2021-09-17 DEVICE — DEV CONTRL TISS STRATAFIX SYMM PDS PLUS VIL CT-1 60CM: Type: IMPLANTABLE DEVICE | Site: HIP | Status: FUNCTIONAL

## 2021-09-17 DEVICE — R3 3 HOLE ACETABULAR SHELL 58MM
Type: IMPLANTABLE DEVICE | Site: HIP | Status: FUNCTIONAL
Brand: R3 ACETABULAR

## 2021-09-17 DEVICE — REFLECTION SPHERICAL HEAD SCREW 20MM
Type: IMPLANTABLE DEVICE | Site: HIP | Status: FUNCTIONAL
Brand: REFLECTION

## 2021-09-17 DEVICE — POLARSTEM COLLAR STANDARD                                    NON-CEMENTED WITH TI/HA 4
Type: IMPLANTABLE DEVICE | Site: HIP | Status: FUNCTIONAL
Brand: POLARSTEM

## 2021-09-17 RX ORDER — ASPIRIN 81 MG/1
81 TABLET ORAL EVERY 12 HOURS SCHEDULED
Status: CANCELLED | OUTPATIENT
Start: 2021-09-18 | End: 2021-10-18

## 2021-09-17 RX ORDER — DEXAMETHASONE SODIUM PHOSPHATE 10 MG/ML
INJECTION INTRAMUSCULAR; INTRAVENOUS AS NEEDED
Status: DISCONTINUED | OUTPATIENT
Start: 2021-09-17 | End: 2021-09-17 | Stop reason: SURG

## 2021-09-17 RX ORDER — MELOXICAM 15 MG/1
15 TABLET ORAL ONCE
Status: COMPLETED | OUTPATIENT
Start: 2021-09-17 | End: 2021-09-17

## 2021-09-17 RX ORDER — ROCURONIUM BROMIDE 10 MG/ML
INJECTION, SOLUTION INTRAVENOUS AS NEEDED
Status: DISCONTINUED | OUTPATIENT
Start: 2021-09-17 | End: 2021-09-17 | Stop reason: SURG

## 2021-09-17 RX ORDER — PROMETHAZINE HYDROCHLORIDE 25 MG/1
25 SUPPOSITORY RECTAL ONCE AS NEEDED
Status: DISCONTINUED | OUTPATIENT
Start: 2021-09-17 | End: 2021-09-17 | Stop reason: HOSPADM

## 2021-09-17 RX ORDER — SODIUM CHLORIDE 0.9 % (FLUSH) 0.9 %
3-10 SYRINGE (ML) INJECTION AS NEEDED
Status: DISCONTINUED | OUTPATIENT
Start: 2021-09-17 | End: 2021-09-17 | Stop reason: HOSPADM

## 2021-09-17 RX ORDER — ACETAMINOPHEN 325 MG/1
325 TABLET ORAL EVERY 4 HOURS PRN
Status: CANCELLED | OUTPATIENT
Start: 2021-09-17 | End: 2021-10-01

## 2021-09-17 RX ORDER — HYDROMORPHONE HCL 110MG/55ML
PATIENT CONTROLLED ANALGESIA SYRINGE INTRAVENOUS AS NEEDED
Status: DISCONTINUED | OUTPATIENT
Start: 2021-09-17 | End: 2021-09-17 | Stop reason: SURG

## 2021-09-17 RX ORDER — PROMETHAZINE HYDROCHLORIDE 25 MG/1
25 TABLET ORAL ONCE AS NEEDED
Status: DISCONTINUED | OUTPATIENT
Start: 2021-09-17 | End: 2021-09-17 | Stop reason: HOSPADM

## 2021-09-17 RX ORDER — SODIUM CHLORIDE, SODIUM LACTATE, POTASSIUM CHLORIDE, CALCIUM CHLORIDE 600; 310; 30; 20 MG/100ML; MG/100ML; MG/100ML; MG/100ML
9 INJECTION, SOLUTION INTRAVENOUS CONTINUOUS
Status: DISCONTINUED | OUTPATIENT
Start: 2021-09-17 | End: 2021-09-17 | Stop reason: HOSPADM

## 2021-09-17 RX ORDER — FLUMAZENIL 0.1 MG/ML
0.2 INJECTION INTRAVENOUS AS NEEDED
Status: DISCONTINUED | OUTPATIENT
Start: 2021-09-17 | End: 2021-09-17 | Stop reason: HOSPADM

## 2021-09-17 RX ORDER — HYDROMORPHONE HYDROCHLORIDE 1 MG/ML
0.5 INJECTION, SOLUTION INTRAMUSCULAR; INTRAVENOUS; SUBCUTANEOUS
Status: DISCONTINUED | OUTPATIENT
Start: 2021-09-17 | End: 2021-09-17 | Stop reason: HOSPADM

## 2021-09-17 RX ORDER — FENTANYL CITRATE 50 UG/ML
50 INJECTION, SOLUTION INTRAMUSCULAR; INTRAVENOUS
Status: DISCONTINUED | OUTPATIENT
Start: 2021-09-17 | End: 2021-09-17 | Stop reason: HOSPADM

## 2021-09-17 RX ORDER — ONDANSETRON 2 MG/ML
INJECTION INTRAMUSCULAR; INTRAVENOUS AS NEEDED
Status: DISCONTINUED | OUTPATIENT
Start: 2021-09-17 | End: 2021-09-17 | Stop reason: SURG

## 2021-09-17 RX ORDER — NALOXONE HCL 0.4 MG/ML
0.2 VIAL (ML) INJECTION AS NEEDED
Status: DISCONTINUED | OUTPATIENT
Start: 2021-09-17 | End: 2021-09-17 | Stop reason: HOSPADM

## 2021-09-17 RX ORDER — HYDRALAZINE HYDROCHLORIDE 20 MG/ML
5 INJECTION INTRAMUSCULAR; INTRAVENOUS
Status: DISCONTINUED | OUTPATIENT
Start: 2021-09-17 | End: 2021-09-17 | Stop reason: HOSPADM

## 2021-09-17 RX ORDER — NEOSTIGMINE METHYLSULFATE 0.5 MG/ML
INJECTION, SOLUTION INTRAVENOUS AS NEEDED
Status: DISCONTINUED | OUTPATIENT
Start: 2021-09-17 | End: 2021-09-17 | Stop reason: SURG

## 2021-09-17 RX ORDER — PREGABALIN 75 MG/1
150 CAPSULE ORAL ONCE
Status: COMPLETED | OUTPATIENT
Start: 2021-09-17 | End: 2021-09-17

## 2021-09-17 RX ORDER — DIPHENHYDRAMINE HYDROCHLORIDE 50 MG/ML
12.5 INJECTION INTRAMUSCULAR; INTRAVENOUS
Status: DISCONTINUED | OUTPATIENT
Start: 2021-09-17 | End: 2021-09-17 | Stop reason: HOSPADM

## 2021-09-17 RX ORDER — HYDROCODONE BITARTRATE AND ACETAMINOPHEN 7.5; 325 MG/1; MG/1
2 TABLET ORAL EVERY 4 HOURS PRN
Status: CANCELLED | OUTPATIENT
Start: 2021-09-17 | End: 2021-09-27

## 2021-09-17 RX ORDER — ONDANSETRON 4 MG/1
4 TABLET, FILM COATED ORAL EVERY 6 HOURS PRN
Status: CANCELLED | OUTPATIENT
Start: 2021-09-17

## 2021-09-17 RX ORDER — POLYETHYLENE GLYCOL 3350 17 G/17G
17 POWDER, FOR SOLUTION ORAL 2 TIMES DAILY
Qty: 238 G | Refills: 0 | Status: SHIPPED | OUTPATIENT
Start: 2021-09-17 | End: 2021-09-24

## 2021-09-17 RX ORDER — MIDAZOLAM HYDROCHLORIDE 1 MG/ML
0.5 INJECTION INTRAMUSCULAR; INTRAVENOUS
Status: DISCONTINUED | OUTPATIENT
Start: 2021-09-17 | End: 2021-09-17 | Stop reason: HOSPADM

## 2021-09-17 RX ORDER — EPHEDRINE SULFATE 50 MG/ML
5 INJECTION, SOLUTION INTRAVENOUS ONCE AS NEEDED
Status: DISCONTINUED | OUTPATIENT
Start: 2021-09-17 | End: 2021-09-17 | Stop reason: HOSPADM

## 2021-09-17 RX ORDER — PANTOPRAZOLE SODIUM 40 MG/1
40 TABLET, DELAYED RELEASE ORAL DAILY
Qty: 14 TABLET | Refills: 0 | Status: SHIPPED | OUTPATIENT
Start: 2021-09-17 | End: 2021-10-01

## 2021-09-17 RX ORDER — HYDROCODONE BITARTRATE AND ACETAMINOPHEN 7.5; 325 MG/1; MG/1
1 TABLET ORAL EVERY 6 HOURS PRN
Qty: 60 TABLET | Refills: 0 | Status: SHIPPED | OUTPATIENT
Start: 2021-09-17 | End: 2021-10-05

## 2021-09-17 RX ORDER — HYDRALAZINE HYDROCHLORIDE 20 MG/ML
INJECTION INTRAMUSCULAR; INTRAVENOUS AS NEEDED
Status: DISCONTINUED | OUTPATIENT
Start: 2021-09-17 | End: 2021-09-17 | Stop reason: SURG

## 2021-09-17 RX ORDER — OXYCODONE AND ACETAMINOPHEN 10; 325 MG/1; MG/1
1 TABLET ORAL EVERY 4 HOURS PRN
Status: DISCONTINUED | OUTPATIENT
Start: 2021-09-17 | End: 2021-09-17 | Stop reason: HOSPADM

## 2021-09-17 RX ORDER — METOPROLOL SUCCINATE 50 MG/1
100 TABLET, EXTENDED RELEASE ORAL DAILY
Status: CANCELLED | OUTPATIENT
Start: 2021-09-17

## 2021-09-17 RX ORDER — POLYETHYLENE GLYCOL 3350 17 G/17G
17 POWDER, FOR SOLUTION ORAL 2 TIMES DAILY
Status: CANCELLED | OUTPATIENT
Start: 2021-09-17 | End: 2021-10-01

## 2021-09-17 RX ORDER — ASPIRIN 81 MG/1
81 TABLET ORAL 2 TIMES DAILY
Qty: 60 TABLET | Refills: 0 | Status: SHIPPED | OUTPATIENT
Start: 2021-09-18 | End: 2021-10-05 | Stop reason: SDUPTHER

## 2021-09-17 RX ORDER — MAGNESIUM HYDROXIDE 1200 MG/15ML
LIQUID ORAL AS NEEDED
Status: DISCONTINUED | OUTPATIENT
Start: 2021-09-17 | End: 2021-09-17 | Stop reason: HOSPADM

## 2021-09-17 RX ORDER — TRANEXAMIC ACID 100 MG/ML
INJECTION, SOLUTION INTRAVENOUS AS NEEDED
Status: DISCONTINUED | OUTPATIENT
Start: 2021-09-17 | End: 2021-09-17 | Stop reason: SURG

## 2021-09-17 RX ORDER — ONDANSETRON 2 MG/ML
4 INJECTION INTRAMUSCULAR; INTRAVENOUS ONCE AS NEEDED
Status: DISCONTINUED | OUTPATIENT
Start: 2021-09-17 | End: 2021-09-17 | Stop reason: HOSPADM

## 2021-09-17 RX ORDER — PROPOFOL 10 MG/ML
VIAL (ML) INTRAVENOUS AS NEEDED
Status: DISCONTINUED | OUTPATIENT
Start: 2021-09-17 | End: 2021-09-17 | Stop reason: SURG

## 2021-09-17 RX ORDER — LABETALOL HYDROCHLORIDE 5 MG/ML
5 INJECTION, SOLUTION INTRAVENOUS
Status: DISCONTINUED | OUTPATIENT
Start: 2021-09-17 | End: 2021-09-17 | Stop reason: HOSPADM

## 2021-09-17 RX ORDER — LIDOCAINE HYDROCHLORIDE 10 MG/ML
0.5 INJECTION, SOLUTION EPIDURAL; INFILTRATION; INTRACAUDAL; PERINEURAL ONCE AS NEEDED
Status: DISCONTINUED | OUTPATIENT
Start: 2021-09-17 | End: 2021-09-17 | Stop reason: HOSPADM

## 2021-09-17 RX ORDER — KETAMINE HYDROCHLORIDE 10 MG/ML
INJECTION INTRAMUSCULAR; INTRAVENOUS AS NEEDED
Status: DISCONTINUED | OUTPATIENT
Start: 2021-09-17 | End: 2021-09-17 | Stop reason: SURG

## 2021-09-17 RX ORDER — HYDROCODONE BITARTRATE AND ACETAMINOPHEN 7.5; 325 MG/1; MG/1
1 TABLET ORAL ONCE AS NEEDED
Status: COMPLETED | OUTPATIENT
Start: 2021-09-17 | End: 2021-09-17

## 2021-09-17 RX ORDER — HYDROCODONE BITARTRATE AND ACETAMINOPHEN 7.5; 325 MG/1; MG/1
1 TABLET ORAL EVERY 4 HOURS PRN
Status: CANCELLED | OUTPATIENT
Start: 2021-09-17 | End: 2021-09-27

## 2021-09-17 RX ORDER — MULTIPLE VITAMINS W/ MINERALS TAB 9MG-400MCG
2 TAB ORAL DAILY
COMMUNITY
End: 2021-09-17 | Stop reason: HOSPADM

## 2021-09-17 RX ORDER — MELOXICAM 15 MG/1
15 TABLET ORAL DAILY
Status: CANCELLED | OUTPATIENT
Start: 2021-09-18 | End: 2021-10-03

## 2021-09-17 RX ORDER — CEFAZOLIN SODIUM 2 G/100ML
2 INJECTION, SOLUTION INTRAVENOUS ONCE
Status: COMPLETED | OUTPATIENT
Start: 2021-09-17 | End: 2021-09-17

## 2021-09-17 RX ORDER — SODIUM CHLORIDE 0.9 % (FLUSH) 0.9 %
3 SYRINGE (ML) INJECTION EVERY 12 HOURS SCHEDULED
Status: DISCONTINUED | OUTPATIENT
Start: 2021-09-17 | End: 2021-09-17 | Stop reason: HOSPADM

## 2021-09-17 RX ORDER — LIDOCAINE HYDROCHLORIDE 20 MG/ML
INJECTION, SOLUTION INFILTRATION; PERINEURAL AS NEEDED
Status: DISCONTINUED | OUTPATIENT
Start: 2021-09-17 | End: 2021-09-17 | Stop reason: SURG

## 2021-09-17 RX ORDER — PANTOPRAZOLE SODIUM 40 MG/1
40 TABLET, DELAYED RELEASE ORAL EVERY MORNING
Status: CANCELLED | OUTPATIENT
Start: 2021-09-17

## 2021-09-17 RX ORDER — DIPHENHYDRAMINE HCL 25 MG
25 CAPSULE ORAL
Status: DISCONTINUED | OUTPATIENT
Start: 2021-09-17 | End: 2021-09-17 | Stop reason: HOSPADM

## 2021-09-17 RX ORDER — ONDANSETRON 4 MG/1
4 TABLET, FILM COATED ORAL EVERY 8 HOURS PRN
Qty: 10 TABLET | Refills: 0 | Status: SHIPPED | OUTPATIENT
Start: 2021-09-17

## 2021-09-17 RX ORDER — FAMOTIDINE 10 MG/ML
20 INJECTION, SOLUTION INTRAVENOUS ONCE
Status: COMPLETED | OUTPATIENT
Start: 2021-09-17 | End: 2021-09-17

## 2021-09-17 RX ORDER — ONDANSETRON 2 MG/ML
4 INJECTION INTRAMUSCULAR; INTRAVENOUS EVERY 6 HOURS PRN
Status: CANCELLED | OUTPATIENT
Start: 2021-09-17

## 2021-09-17 RX ORDER — CEFAZOLIN SODIUM 2 G/100ML
2 INJECTION, SOLUTION INTRAVENOUS EVERY 8 HOURS
Status: CANCELLED | OUTPATIENT
Start: 2021-09-17 | End: 2021-09-18

## 2021-09-17 RX ORDER — GLYCOPYRROLATE 0.2 MG/ML
INJECTION INTRAMUSCULAR; INTRAVENOUS AS NEEDED
Status: DISCONTINUED | OUTPATIENT
Start: 2021-09-17 | End: 2021-09-17 | Stop reason: SURG

## 2021-09-17 RX ORDER — IBUPROFEN 600 MG/1
600 TABLET ORAL ONCE AS NEEDED
Status: DISCONTINUED | OUTPATIENT
Start: 2021-09-17 | End: 2021-09-17 | Stop reason: HOSPADM

## 2021-09-17 RX ORDER — FLUOXETINE HYDROCHLORIDE 20 MG/1
20 CAPSULE ORAL DAILY
Status: CANCELLED | OUTPATIENT
Start: 2021-09-17

## 2021-09-17 RX ORDER — FENTANYL CITRATE 50 UG/ML
INJECTION, SOLUTION INTRAMUSCULAR; INTRAVENOUS AS NEEDED
Status: DISCONTINUED | OUTPATIENT
Start: 2021-09-17 | End: 2021-09-17 | Stop reason: SURG

## 2021-09-17 RX ADMIN — NEOSTIGMINE METHYLSULFATE 3 MG: 0.5 INJECTION INTRAVENOUS at 09:45

## 2021-09-17 RX ADMIN — MELOXICAM 15 MG: 15 TABLET ORAL at 07:44

## 2021-09-17 RX ADMIN — HYDROCODONE BITARTRATE AND ACETAMINOPHEN 1 TABLET: 7.5; 325 TABLET ORAL at 10:25

## 2021-09-17 RX ADMIN — FAMOTIDINE 20 MG: 10 INJECTION INTRAVENOUS at 07:50

## 2021-09-17 RX ADMIN — PROPOFOL 40 MG: 10 INJECTION, EMULSION INTRAVENOUS at 09:36

## 2021-09-17 RX ADMIN — CEFAZOLIN SODIUM 2 G: 2 INJECTION, SOLUTION INTRAVENOUS at 07:57

## 2021-09-17 RX ADMIN — GLYCOPYRROLATE 0.6 MG: 0.2 INJECTION INTRAMUSCULAR; INTRAVENOUS at 09:45

## 2021-09-17 RX ADMIN — HYDRALAZINE HYDROCHLORIDE 5 MG: 20 INJECTION, SOLUTION INTRAMUSCULAR; INTRAVENOUS at 08:40

## 2021-09-17 RX ADMIN — DEXAMETHASONE SODIUM PHOSPHATE 8 MG: 10 INJECTION INTRAMUSCULAR; INTRAVENOUS at 08:13

## 2021-09-17 RX ADMIN — MIDAZOLAM 0.5 MG: 1 INJECTION INTRAMUSCULAR; INTRAVENOUS at 07:51

## 2021-09-17 RX ADMIN — TRANEXAMIC ACID 1000 MG: 1 INJECTION, SOLUTION INTRAVENOUS at 08:21

## 2021-09-17 RX ADMIN — ROCURONIUM BROMIDE 10 MG: 50 INJECTION INTRAVENOUS at 08:35

## 2021-09-17 RX ADMIN — PROPOFOL 150 MG: 10 INJECTION, EMULSION INTRAVENOUS at 08:13

## 2021-09-17 RX ADMIN — PROPOFOL 150 MCG/KG/MIN: 10 INJECTION, EMULSION INTRAVENOUS at 08:15

## 2021-09-17 RX ADMIN — PREGABALIN 150 MG: 75 CAPSULE ORAL at 07:44

## 2021-09-17 RX ADMIN — ONDANSETRON 4 MG: 2 INJECTION INTRAMUSCULAR; INTRAVENOUS at 09:01

## 2021-09-17 RX ADMIN — LIDOCAINE HYDROCHLORIDE 75 MG: 20 INJECTION, SOLUTION INFILTRATION; PERINEURAL at 08:13

## 2021-09-17 RX ADMIN — HYDRALAZINE HYDROCHLORIDE 5 MG: 20 INJECTION, SOLUTION INTRAMUSCULAR; INTRAVENOUS at 08:57

## 2021-09-17 RX ADMIN — SODIUM CHLORIDE, POTASSIUM CHLORIDE, SODIUM LACTATE AND CALCIUM CHLORIDE 9 ML/HR: 600; 310; 30; 20 INJECTION, SOLUTION INTRAVENOUS at 07:20

## 2021-09-17 RX ADMIN — PROPOFOL 50 MG: 10 INJECTION, EMULSION INTRAVENOUS at 08:57

## 2021-09-17 RX ADMIN — FENTANYL CITRATE 100 MCG: 50 INJECTION INTRAMUSCULAR; INTRAVENOUS at 08:13

## 2021-09-17 RX ADMIN — TRANEXAMIC ACID 1000 MG: 1 INJECTION, SOLUTION INTRAVENOUS at 09:28

## 2021-09-17 RX ADMIN — KETAMINE HYDROCHLORIDE 30 MG: 10 INJECTION INTRAMUSCULAR; INTRAVENOUS at 08:13

## 2021-09-17 RX ADMIN — HYDROMORPHONE HYDROCHLORIDE 0.5 MG: 2 INJECTION, SOLUTION INTRAMUSCULAR; INTRAVENOUS; SUBCUTANEOUS at 08:13

## 2021-09-17 RX ADMIN — ROCURONIUM BROMIDE 40 MG: 50 INJECTION INTRAVENOUS at 08:13

## 2021-09-17 RX ADMIN — KETAMINE HYDROCHLORIDE 10 MG: 10 INJECTION INTRAMUSCULAR; INTRAVENOUS at 08:58

## 2021-09-17 NOTE — DISCHARGE PLACEMENT REQUEST
"Fariba Anthony (66 y.o. Female)     Date of Birth Social Security Number Address Home Phone MRN    1955  [id number] [id number] [phone] [id number]    Alevism Marital Status          None        Admission Date Admission Type Admitting Provider Attending Provider Department, Room/Bed    9/17/21 Elective Ronald Maldonado MD Brown, Reid B, MD [facility] MAIN OR, MICHELLE Main OR/MAIN OR    Discharge Date Discharge Disposition Discharge Destination         Home-Health Care [facility]              Attending Provider: Ronald Maldonado MD    Allergies: Morphine And Related    Isolation: None   Infection: None   Code Status: Not on file    Ht: 172.7 cm (68\")   Wt: 90.5 kg (199 lb 8.3 oz)    Admission Cmt: None   Principal Problem: Primary osteoarthritis of right hip [M16.11]                 Active Insurance as of 9/17/2021     Primary Coverage     Payor Plan Insurance Group Employer/Plan Group    HUMANA HUMANA [id number]     Payor Plan Address Payor Plan Phone Number Payor Plan Fax Number Effective Dates    [address] [address] [phone]  7/1/2018 - None Entered    [facility] [id number]       Subscriber Name Subscriber Birth Date Member ID       FARIBA ANTHONY 1955 [id number]           Secondary Coverage     Payor Plan Insurance Group Employer/Plan Group    MEDICARE MEDICARE A & B      Payor Plan Address Payor Plan Phone Number Payor Plan Fax Number Effective Dates    [address] [phone]  4/1/2020 - None Entered    [facility] [id number]       Subscriber Name Subscriber Birth Date Member ID       FARIBA ANTHONY 1955 [id number]                 Emergency Contacts      (Rel.) Home Phone Work Phone Mobile Phone    Antonio Anthony (Brother) [phone] -- --              "

## 2021-09-17 NOTE — OP NOTE
Name: Fariba Gauthier  YOB: 1955    DATE OF SURGERY: 9/17/2021    PREOPERATIVE DIAGNOSIS: Right hip end-stage osteoarthritis    POSTOPERATIVE DIAGNOSIS: Right hip end-stage osteoarthritis    PROCEDURE PERFORMED: Right  total hip replacement    SURGEON: Ronald Maldonado M.D.    ASSISTANT: DELTA MORRISON    A surgical assistant was integral in ensuring a successful outcome with this procedure.  The assistant was utilized to assist in positioning the patient, draping the patient, was used throughout the case to provide with retraction of tissues, suctioning of blood and body fluids for visualization, positioning of the extremity to allow for proper exposure so that I could perform the procedure.  Without the use of a surgical assistant during this procedure I feel that the outcome may have been compromised or would have been suboptimal or at risk for complications.    IMPLANTS:  Implant Name Type Inv. Item Serial No.  Lot No. LRB No. Used Action   SUT CONTRL TISS STRATAFIX SYMM PDS PLUS SANCHEZ CT-1 60CM - [id number] Implant SUT CONTRL TISS STRATAFIX SYMM PDS PLUS SANCHEZ CT-1 60CM  [employer]  DIV OF J AND J . Right 1 Implanted   SUT CONTRL TISS STRATAFIXSPIRALMNCRYL PLSPS2 REV3/0 45CM - [id number] Implant SUT CONTRL TISS STRATAFIXSPIRALMNCRYL PLSPS2 REV3/0 45CM  [employer]  DIV OF J AND J . Right 1 Implanted   SHLL ACET R3 3H QTY40TC - [id number] Implant SHLL ACET R3 3H [id number]  CUNHA AND NEPHEW [id number] Right 1 Implanted   LINER ACET R3 XLPE 20D [id number] - [id number] Implant LINER ACET R3 XLPE 20D [id number]  [employer]UN[employer] AND NEPHEW [id number] Right 1 Implanted   SCRW SPH HD REFLECTION 6.5X30MM - [id number] Implant SCRW SPH HD REFLECTION 6.5X30MM  CUNHA AND NEPHEW [id number] Right 1 Implanted   SCRW SPH HD REFLECTION 6.5X20MM - [id number] Implant SCRW SPH HD REFLECTION 6.5X20MM  CUNHA AND NEPHEW [id number] Right 1 Implanted   STEM FEM/HIP POLARSTEM W/COLR STD SZ4 - [id number] Implant STEM FEM/HIP POLARSTEM W/COLR STD  SZ4  CUNHA AND NEPHEW [id number] Right 1 Implanted   HD FEM/HIP OXINIUM TPR 12/14 36MM PLS0 - [id number] Implant HD FEM/HIP OXINIUM TPR 12/14 36MM PLS0  CUNHA AND NEPHEW [id number] Right 1 Implanted       Estimated Blood Loss: 200cc  Specimens : none  Complications: none    DESCRIPTION OF PROCEDURE:    The patient was taken to the operating room and placed in the supine position. A sequential compression device was carefully placed on the non-operative leg. Preoperative antibiotics were administered. Surgical time out was performed. After adequate induction of anesthesia the patient was then transferred onto the  table and positioned appropriately in the lateral decubitus position. The hip was then prepped and draped in the usual sterile fashion.    A posterior lateral surgical incision was then made.  The gluteus gideon fascia was then divided the gluteus gideon muscle was then bluntly dissected.  A Charnley self-retaining retractor was then placed.  A posterior capsulotomy was then performed.  The superior limb was divided in line with the piriformis tendon.  The hip was then dislocated.  There were end-stage arthritic findings.  The femoral neck osteotomy was performed according to the level dictated by the template.  The acetabulum was exposed with standard retractors.  The labrum and pulvinar were then excised.  The cup was then medialized with the starting acetabular reamer to the medial wall.  I then progressively reamed up to the appropriate size and 45° of abduction and 20° of anteversion. Line to line reaming was performed. At this point the bone was nicely prepared there was excellent bleeding bone. We then impacted the acetabular component in 45 of abduction and 20 of anteversion the cup was stable.  Per routine we augmented the fixation with 2 screws in the posterior and superior quadrant  The final liner was then placed and it locked in nicely.  At this point we injected the hip with anesthetic cocktail  solution.  We then turned our attention to the femur.   Standard retractors were placed to expose the femur.  The box osteotome was used to create a starting point.  The canal finder was then used to sound the canal.  The lateralizing reamer was then used to lateralize into the greater trochanter.  We progressively reamed and broached until the broach was very solid to axial and rotational stresses.  At this point we placed the trial neck and head hip was very stable to flexion and internal rotation as well as extension and external rotation.  The leg lengths were measured to be equal.  We then removed the trial components,copiously irrigated the hip, and then impacted the final stem.  At this point we then trialed and chose the final head. The head was placed on a clean dry taper.  The leg lengths were again measured to be equal.  At this point the hip was copiously irrigated with pulse lavage and the capsule was then reapproximated with #1 PDS suture, and the remainder of the hip was closed in multiple layers in standard fashion..  There was excellent hemostasis. We placed a one-eighth inch Hemovac drain.  Sterile dressing were applied. At the end of the case, the sponge and needle counts were reported as being correct. There were no known complications. The patient was then transported to the recovery room.      Ronald Maldonado M.D.  9/17/2021

## 2021-09-17 NOTE — ANESTHESIA PREPROCEDURE EVALUATION
Anesthesia Evaluation     Patient summary reviewed and Nursing notes reviewed   history of anesthetic complications (gets mean):  NPO Solid Status: > 8 hours  NPO Liquid Status: > 8 hours           Airway   Mallampati: II  TM distance: <3 FB  Neck ROM: full  Possible difficult intubation  Dental - normal exam     Pulmonary     breath sounds clear to auscultation  (+) a smoker Current,   Cardiovascular     Rhythm: regular    (+) hypertension,       Neuro/Psych  (+) psychiatric history Anxiety and Depression,     GI/Hepatic/Renal/Endo      Musculoskeletal     Abdominal   (+) obese,    Substance History      OB/GYN          Other                        Anesthesia Plan    ASA 2     general   (Have CMAC available, pt is going home)  intravenous induction     Anesthetic plan, all risks, benefits, and alternatives have been provided, discussed and informed consent has been obtained with: patient.

## 2021-09-17 NOTE — THERAPY DISCHARGE NOTE
"Patient Name: Fariba Gauthier  : 1955    MRN: [id number]                              Today's Date: 2021       Admit Date: 2021    Visit Dx:     ICD-10-CM ICD-9-CM   1. Status post hip surgery  Z98.890 V45.89   2. Primary osteoarthritis of right hip  M16.11 715.15     Patient Active Problem List   Diagnosis   • Primary osteoarthritis of right hip     Past Medical History:   Diagnosis Date   • Anesthesia complication     PT WANTS EVERYONE TO KNOW SHE \"GETS MEAN\" AFTER   • Depression with anxiety    • Hypertension    • Seasonal allergies      Past Surgical History:   Procedure Laterality Date   • ANKLE LIGAMENT RECONSTRUCTION     • COLONOSCOPY     • OVARIAN CYST REMOVAL       General Information     Row Name 21 1302          Physical Therapy Time and Intention    Document Type  evaluation  -     Mode of Treatment  individual therapy;physical therapy  -     Row Name 21 1302          General Information    Prior Level of Function  independent:  -     Existing Precautions/Restrictions  fall  -     Barriers to Rehab  none identified  -     Row Name 21 1302          Living Environment    Lives With  alone  -     Row Name 21 1302          Home Main Entrance    Number of Stairs, Main Entrance  two staying with her brother  -     Row Name 21 1302          Cognition    Orientation Status (Cognition)  oriented x 4  -       User Key  (r) = Recorded By, (t) = Taken By, (c) = Cosigned By    Initials Name Provider Type    Karen Posadas, PT Physical Therapist        Mobility     Row Name 21 1302          Bed Mobility    Bed Mobility  supine-sit;sit-supine  -     Supine-Sit [place] (Bed Mobility)  supervision  -     Sit-Supine [place] (Bed Mobility)  supervision  -     Row Name 21 1302          Sit-Stand Transfer    Sit-Stand [place] (Transfers)  contact guard  -     Assistive Device (Sit-Stand Transfers)  walker, " front-wheeled  -KH     Row Name 09/17/21 1302          Gait/Stairs (Locomotion)    Yellow Medicine Level (Gait)  contact guard  -     Assistive Device (Gait)  walker, front-wheeled  -     Distance in Feet (Gait)  120  -KH     Deviations/Abnormal Patterns (Gait)  antalgic;gait speed decreased  -KH     Yellow Medicine Level (Stairs)  contact guard  -     Handrail Location (Stairs)  none backwards with walker  -KH     Number of Steps (Stairs)  2  -KH     Ascending Technique (Stairs)  step-to-step  -KH     Descending Technique (Stairs)  step-to-step  -KH       User Key  (r) = Recorded By, (t) = Taken By, (c) = Cosigned By    Initials Name Provider Type    Karen Posadas, CIS[facility] Physical Therapist        Obj/Interventions     Row Name 09/17/21 1303          Range of Motion Comprehensive    Comment, General Range of Motion  R hip limited by discomfort  -KH     Row Name 09/17/21 1303          Strength Comprehensive (MMT)    Comment, General Manual Muscle Testing (MMT) Assessment  WFL  -[facility] Name 09/17/21 1303          Motor Skills    Therapeutic Exercise  hip R BEHZAD protocol x 10 reps  -       User Key  (r) = Recorded By, (t) = Taken By, (c) = Cosigned By    Initials Name Provider Type    Karen Posadas, PT Physical Therapist        Goals/Plan    No documentation.       Clinical Impression     [facility] Name 09/17/21 1303          Pain    Additional Documentation  Pain Scale: Numbers Pre/Post-Treatment (Group)  -KH     Row Name 09/17/21 1303          Pain Scale: Numbers Pre/Post-Treatment    Pretreatment Pain Rating  2/10  -KH     Posttreatment Pain Rating  2/10  -KH     Pain Location - Side  Right  -KH     Pain Location  hip  -     Pain Intervention(s)  Cold applied;Ambulation/increased activity;Repositioned  -     Row Name 09/17/21 1303          Plan of Care Review    Plan of Care Reviewed With  patient  -     Outcome Summary  Pt is s/p R BEHZAD and is soing well. She is ambulating with CGA- SBA  with Rwx, safely navigated stairs and demosntrated independence with HEP. Pt is safe to d/c home. PT will sign off.  -     Row Name 09/17/21 1303          Therapy Assessment/Plan (PT)    Patient/Family Therapy Goals Statement (PT)  home with brother for now  -     Criteria for Skilled Interventions Met (PT)  no problems identified which require skilled intervention  -     Row Name 09/17/21 1303          Positioning and Restraints    Pre-Treatment Position  in bed  -     Post Treatment Position  bed  -KH     In Bed  fowlers;call light within reach;encouraged to call for assist;notified nsg;with family/caregiver  -       User Key  (r) = Recorded By, (t) = Taken By, (c) = Cosigned By    Initials Name Provider Type    Karne Posadas PT Physical Therapist        Outcome Measures     Row Name 09/17/21 1305          How much help from another person do you currently need...    Turning from your back to your side while in flat bed without using bedrails?  4  -KH     Moving from lying on back to sitting on the side of a flat bed without bedrails?  4  -KH     Moving to and from a bed to a chair (including a wheelchair)?  3  -KH     Standing up from a chair using your arms (e.g., wheelchair, bedside chair)?  3  -KH     Climbing 3-5 steps with a railing?  3  -KH     To walk in hospital room?  3  -KH     AM-PAC 6 Clicks Score (PT)  20  -     Row Name 09/17/21 1305          Functional Assessment    Outcome Measure Options  AM-PAC 6 Clicks Basic Mobility (PT)  -       User Key  (r) = Recorded By, (t) = Taken By, (c) = Cosigned By    Initials Name Provider Type    Karen Posadas PT Physical Therapist          PT Recommendation and Plan     Plan of Care Reviewed With: patient  Outcome Summary: Pt is s/p R BEHZAD and is soing well. She is ambulating with CGA- SBA with Rwx, safely navigated stairs and demosntrated independence with HEP. Pt is safe to d/c home. PT will sign off.     Time  Calculation:   PT Charges     Row Name 09/17/21 1305             Time Calculation    Start Time  1133  -KH      Stop Time  1155  -KH      Time Calculation (min)  22 min  -KH         Time Calculation- PT    Total Timed Code Minutes- PT  11 minute(s)  -KH         Timed Charges    14077 - PT Therapeutic Exercise Minutes  11  -KH         Untimed Charges    PT Eval/Re-eval Minutes  11  -KH         Total Minutes    Timed Charges Total Minutes  11  -KH      Untimed Charges Total Minutes  11  -KH       Total Minutes  22  -KH        User Key  (r) = Recorded By, (t) = Taken By, (c) = Cosigned By    Initials Name Provider Type    Karen Posadas, PT Physical Therapist        Therapy Charges for Today     Code Description Service Date Service Provider Modifiers Qty    44232370520 HC PT THER PROC EA 15 MIN 9/17/2021 Karen Mckeon, PT GP 1    77043504546 HC PT EVAL LOW COMPLEXITY 1 9/17/2021 Karen Mckeon, PT GP 1          PT G-Codes  Outcome Measure Options: AM-PAC 6 Clicks Basic Mobility (PT)  AM-PAC 6 Clicks Score (PT): 20    PT Discharge Summary  Anticipated Discharge Disposition (PT): home with assist, home with home health    Karen Mckeon, PT  9/17/2021

## 2021-09-17 NOTE — ANESTHESIA PROCEDURE NOTES
Airway  Urgency: elective    Date/Time: 9/17/2021 8:17 AM  Airway not difficult    General Information and Staff    Patient location during procedure: OR  Anesthesiologist: Murray Reid MD  CRNA: Mariama Leahy CRNA    Indications and Patient Condition  Indications for airway management: airway protection    Preoxygenated: yes  MILS maintained throughout  Mask difficulty assessment: 1 - vent by mask    Final Airway Details  Final airway type: endotracheal airway      Successful airway: ETT  Cuffed: yes   Successful intubation technique: direct laryngoscopy  Facilitating devices/methods: intubating stylet  Endotracheal tube insertion site: oral  Blade: Haddad  Blade size: 2  ETT size (mm): 7.0  Cormack-Lehane Classification: grade IIb - view of arytenoids or posterior of glottis only  Placement verified by: chest auscultation and capnometry   Measured from: lips  Number of attempts at approach: 1  Assessment: lips, teeth, and gum same as pre-op and atraumatic intubation    Additional Comments  Atraumatic, Secured after verification of placement

## 2021-09-17 NOTE — CASE MANAGEMENT/SOCIAL WORK
Discharge Planning Assessment  [facility]     Patient Name: Fariba Gauthier  MRN: [id number]  Today's Date: 9/17/2021    Admit Date: 9/17/2021    Discharge Needs Assessment     Row Name 09/17/21 1224       Living Environment    Unique Family Situation  Patient plans to stay with her brother/Antonio after d/c for a few days.    Current Living Arrangements  home/apartment/condo    Primary Care Provided by  self    Provides Primary Care For  no one    Family Caregiver if Needed  sibling(s)    Family Caregiver Names  Brother/Antonio.    Quality of Family Relationships  helpful;involved;supportive    Able to Return to Prior Arrangements  yes       Resource/Environmental Concerns    Transportation Concerns  car, none       Transition Planning    Patient/Family Anticipates Transition to  home with family;home with help/services    Patient/Family Anticipated Services at Transition      Transportation Anticipated  family or friend will provide       Discharge Needs Assessment    Readmission Within the Last 30 Days  no previous admission in last 30 days    Equipment Currently Used at Home  walker, rolling;cane, straight;commode;shower chair    Discharge Facility/Level of Care Needs  home with home health    Provided Post Acute Provider List?  Yes    Post Acute Provider List  Home Health    Patient's Choice of Community Agency(s)  [facility].        Discharge Plan     Row Name 09/17/21 1225       Plan    Plan  Home with family support & [facility].    Patient/Family in Agreement with Plan  yes    Plan Comments  Spoke with the patient, verified current information and explained the role of the CCP. Patient said she has family support. She lives in a one-story house with no basement. There is one step to enter the house. She's IADL and has a walker, cane, commode chair and shower chair. She has no history with SNF/HH. Patient plans to d/c to her brother/Antonio's house with family support & HH PT. She requests [facility]. Referral sent in  Epic. Patient said her brother/Antonio will transport her home by car at d/c. No other needs identified at this time.        Continued Care and Services - Admitted Since 9/17/2021     Home Medical Care     Service Provider Request Status Selected Services Address Phone Fax Patient Preferred     [facility]  Pending - Request Sent N/A [address] [address] [phone] --              Expected Discharge Date and Time     Expected Discharge Date Expected Discharge Time    Sep 17, 2021         Demographic Summary     Row Name 09/17/21 1221       General Information    Admission Type  same day    Reason for Consult  discharge planning    Preferred Language  English     Used During This Interaction  no       Contact Information    Permission Granted to Share Info With  ;family/designee        Functional Status     Row Name 09/17/21 1221       Functional Status    Usual Activity Tolerance  good       Functional Status, IADL    Medications  independent    Meal Preparation  independent    Housekeeping  independent    Laundry  independent    Shopping  independent       Mental Status Summary    Recent Changes in Mental Status/Cognitive Functioning  no changes        Psychosocial     Row Name 09/17/21 1222       Intellectual Performance WDL    Level of Consciousness  Alert       Coping/Stress    Patient Personal Strengths  able to adapt    Sources of Support  sibling(s)    Reaction to Health Status  accepting    Understanding of Condition and Treatment  adequate understanding of medical condition       Developmental Stage (Eriksson's)    Developmental Stage  Stage 8 (65 years-death/Late Adulthood) Integrity vs. Despair        Abuse/Neglect    No documentation.       Legal    No documentation.       Substance Abuse    No documentation.       Patient Forms    No documentation.           Margo Luna RN

## 2021-09-17 NOTE — ADDENDUM NOTE
Addendum  created 09/17/21 1244 by Murray Reid MD    Attestation recorded in Intraprocedure, Intraprocedure Attestations filed

## 2021-09-17 NOTE — PLAN OF CARE
Goal Outcome Evaluation:  Plan of Care Reviewed With: patient           Outcome Summary: pod 0. vss. dsg cdi. bibi in place. ambulates with x1 assist and walker. voiding per brp. pain tolerated with po prn meds. plan to dc home today. educated on fall prevention. pt stable at time of discharge. verbalized understanding of dc instructions.

## 2021-09-17 NOTE — PLAN OF CARE
Goal Outcome Evaluation:  Plan of Care Reviewed With: patient           Outcome Summary: Pt is s/p R BEHZAD and is soing well. She is ambulating with CGA- SBA with Rwx, safely navigated stairs and demosntrated independence with HEP. Pt is safe to d/c home. PT will sign off.

## 2021-09-17 NOTE — ANESTHESIA POSTPROCEDURE EVALUATION
Patient: Fariba Gauthier    Procedure Summary     Date: 09/17/21 Room / Location: [facility] OR [address] [place] [address] MAIN OR    Anesthesia Start: 0800 Anesthesia Stop: 1000    Procedure: TOTAL HIP ARTHROPLASTY (Right Hip) Diagnosis:       Primary osteoarthritis of right hip      (Primary osteoarthritis of right hip [M16.11])    Surgeons: Ronald Maldonado MD Provider: Murray Reid MD    Anesthesia Type: general ASA Status: 2          Anesthesia Type: general    Vitals  Vitals Value Taken Time   /80 09/17/21 1041   Temp 36.5 °C (97.7 °F) 09/17/21 0958   Pulse 63 09/17/21 1046   Resp 16 09/17/21 1025   SpO2 97 % 09/17/21 1046   Vitals shown include unvalidated device data.        Post Anesthesia Care and Evaluation    Patient location during evaluation: PACU  Patient participation: complete - patient participated  Level of consciousness: awake  Pain score: 1  Pain management: adequate  Airway patency: patent  Anesthetic complications: No anesthetic complications  PONV Status: none  Cardiovascular status: acceptable  Respiratory status: acceptable  Hydration status: acceptable

## 2021-09-17 NOTE — PROGRESS NOTES
Patient is discharging today. Please note patient is staying with brother/ Antonio at- [address] [place] [address] and his cell to call for visit scheduling [phone]. Orders in [facility] for home health PT. Thank you !

## 2021-09-17 NOTE — CASE MANAGEMENT/SOCIAL WORK
Case Management Discharge Note      Final Note: Home with family support & [facility].    Provided Post Acute Provider List?: Yes  Post Acute Provider List: Home Health    Selected Continued Care - Discharged on 9/17/2021 Admission date: 9/17/2021 - Discharge disposition: Home-Health Care Svc    Destination    No services have been selected for the patient.              Durable Medical Equipment    No services have been selected for the patient.              Dialysis/Infusion    No services have been selected for the patient.              Home Medical Care Coordination complete.    Service Provider Selected Services Address Phone Fax Patient Preferred    [facility]  [facility] [address] [address] [phone] --          Therapy    No services have been selected for the patient.              Community Resources    No services have been selected for the patient.              Community & DME    No services have been selected for the patient.                       Final Discharge Disposition Code: 06 - home with home health care

## 2021-09-18 ENCOUNTER — HOME CARE VISIT (OUTPATIENT)
Dept: HOME HEALTH SERVICES | Facility: HOME HEALTHCARE | Age: 66
End: 2021-09-18

## 2021-09-18 PROCEDURE — G0151 HHCP-SERV OF PT,EA 15 MIN: HCPCS

## 2021-09-18 NOTE — HOME HEALTH
"Posterior approach THR 9/17 per Dr. Maldonado with follow-up 9/30.  She is staying with brother and sister-in-law.  Sister-in-law was a nurse and stated patient had low oxygen and had a cough and sore throat last night but was fine this am.  Her PLOF was independent with all but had painful gait.  She works full time.   Lives alone with 2 large dogs, plans on going back home early in the week.   AMRITA dressing intact, operating normally and no drainage noted.    TUG score 23 seconds.   She stated Dr. Maldonado was not really worried about the hip precautions because he got the hip in \"tight.\"    Plan for next visit    1.  Review, amend and progress HEP  2. Increase gait duration and possibly try cane.   3.  Pain and edema management as needed"

## 2021-09-19 VITALS
SYSTOLIC BLOOD PRESSURE: 168 MMHG | OXYGEN SATURATION: 97 % | DIASTOLIC BLOOD PRESSURE: 90 MMHG | TEMPERATURE: 98.9 F | HEART RATE: 80 BPM

## 2021-09-20 ENCOUNTER — HOME CARE VISIT (OUTPATIENT)
Dept: HOME HEALTH SERVICES | Facility: HOME HEALTHCARE | Age: 66
End: 2021-09-20

## 2021-09-20 VITALS
DIASTOLIC BLOOD PRESSURE: 84 MMHG | OXYGEN SATURATION: 96 % | TEMPERATURE: 97.6 F | SYSTOLIC BLOOD PRESSURE: 142 MMHG | HEART RATE: 87 BPM

## 2021-09-20 PROCEDURE — G0157 HHC PT ASSISTANT EA 15: HCPCS

## 2021-09-20 NOTE — HOME HEALTH
Subjective:I am doing pretty good. My bowels are doing fine.    Wound: Right Hip covered with AMRITA dressing    Assessment: Patient is moving back to her home tomorrow and will continue PT at her own home. Patient appears safe with mobility around the home with walker and was able to review and progress HEP. Patient able to perform bed mobility and transfers with proper hand placement.    Plan for next visit/Communication  transfers in her home  Gait traiing including steps  HEP in standing  Balance

## 2021-09-23 ENCOUNTER — HOME CARE VISIT (OUTPATIENT)
Dept: HOME HEALTH SERVICES | Facility: HOME HEALTHCARE | Age: 66
End: 2021-09-23

## 2021-09-23 VITALS
SYSTOLIC BLOOD PRESSURE: 138 MMHG | DIASTOLIC BLOOD PRESSURE: 78 MMHG | OXYGEN SATURATION: 95 % | TEMPERATURE: 97.4 F | HEART RATE: 78 BPM

## 2021-09-23 PROCEDURE — G0157 HHC PT ASSISTANT EA 15: HCPCS

## 2021-09-23 NOTE — HOME HEALTH
Subjective:I am glas to be home. I slept a little better    Wound: AMRITA on right hip area. Bruising noted around bandage    Assessment: Patient is doing well with cane in left hand and is manuevering around her home with no LOB. Patient able to perform HEP and  transfers in her own home.     Plan for next visit/Communication  Gait with cane  HEP  Balance

## 2021-09-27 PROCEDURE — G0180 MD CERTIFICATION HHA PATIENT: HCPCS | Performed by: ORTHOPAEDIC SURGERY

## 2021-09-28 ENCOUNTER — HOME CARE VISIT (OUTPATIENT)
Dept: HOME HEALTH SERVICES | Facility: HOME HEALTHCARE | Age: 66
End: 2021-09-28

## 2021-09-28 VITALS
TEMPERATURE: 97.4 F | SYSTOLIC BLOOD PRESSURE: 144 MMHG | OXYGEN SATURATION: 95 % | DIASTOLIC BLOOD PRESSURE: 78 MMHG | HEART RATE: 86 BPM

## 2021-09-28 PROCEDURE — G0157 HHC PT ASSISTANT EA 15: HCPCS

## 2021-09-28 NOTE — HOME HEALTH
Subjective:I still can't sleep, I can't seem to get comfortable ( discussed positioning and use of pillows between knees)    Wound: right hip AMRITA intact, I had to add extra tape secondary to coming loose to prevent water getting in.     Assessment:Patient is manuevering around home with no AD however with some noted antalgic gait secondary to history of right ankle concerns. she was able to go outside with cane and increased distances.     Plan for next visit/Communication  review HEP  gait on different surfaces  balance

## 2021-09-29 ENCOUNTER — TELEPHONE (OUTPATIENT)
Dept: ORTHOPEDIC SURGERY | Facility: HOSPITAL | Age: 66
End: 2021-09-29

## 2021-09-29 NOTE — TELEPHONE ENCOUNTER
Called and spoke with Ms. Gauthier as she is SP RTH. She states she is doing amazing. PT is going great. She is using a cane when going outside but otherwise no DME is used. Her pain is minimal. She was having some trouble sleeping but she slept great last night. Dressing remains CDI. She has a F/U Appt with MD tomorrow. She has no questions/concerns for me at this time. My contact information was given should she need anything. She voiced understanding.

## 2021-09-30 ENCOUNTER — OFFICE VISIT (OUTPATIENT)
Dept: ORTHOPEDIC SURGERY | Facility: CLINIC | Age: 66
End: 2021-09-30

## 2021-09-30 ENCOUNTER — HOME CARE VISIT (OUTPATIENT)
Dept: HOME HEALTH SERVICES | Facility: HOME HEALTHCARE | Age: 66
End: 2021-09-30

## 2021-09-30 VITALS — BODY MASS INDEX: 30.16 KG/M2 | WEIGHT: 199 LBS | HEIGHT: 68 IN | TEMPERATURE: 97.5 F

## 2021-09-30 DIAGNOSIS — Z96.641 HISTORY OF TOTAL RIGHT HIP ARTHROPLASTY: Primary | ICD-10-CM

## 2021-09-30 PROCEDURE — 99024 POSTOP FOLLOW-UP VISIT: CPT | Performed by: ORTHOPAEDIC SURGERY

## 2021-09-30 PROCEDURE — 73502 X-RAY EXAM HIP UNI 2-3 VIEWS: CPT | Performed by: ORTHOPAEDIC SURGERY

## 2021-09-30 NOTE — PROGRESS NOTES
Fariba Gauthier : 1955 MRN: [id number] DATE: 2021    DIAGNOSIS: 2 week follow up right total hip     SUBJECTIVE:Patient returns today for 2 week follow up of right total hip replacement. Patient reports doing well with no unusual complaints. Appears to be progressing appropriately.    OBJECTIVE:   Exam:. The incision is healing appropriately. No sign of infection. Range of motion is progressing as expected. The calf is soft and nontender with a negative Homans sign.    DIAGNOSTIC STUDIES  Xrays: 2 views of the right hip (AP pelvis and lateral right hip) were ordered and reviewed for evaluation of recent hip replacement. They demonstrate a well positioned, well aligned hip replacement without complicating factors noted. In comparison with previous films there has been interval implant placement.    ASSESSMENT: 2 week status post right hip replacement.    PLAN: 1) Staples removed and steri strips applied   2) PT exercises   3) Discontinue LISA hose   4) Continue ice PRN   5) WBAT   6) aspirin 81 mg orally every day for 1 month   7) Follow up in 6 weeks with repeat Xrays of right hip (2views)    Ronald Maldonado MD  2021

## 2021-10-01 ENCOUNTER — HOME CARE VISIT (OUTPATIENT)
Dept: HOME HEALTH SERVICES | Facility: HOME HEALTHCARE | Age: 66
End: 2021-10-01

## 2021-10-01 VITALS
RESPIRATION RATE: 18 BRPM | DIASTOLIC BLOOD PRESSURE: 94 MMHG | HEART RATE: 87 BPM | TEMPERATURE: 97.4 F | OXYGEN SATURATION: 98 % | SYSTOLIC BLOOD PRESSURE: 170 MMHG

## 2021-10-01 PROCEDURE — G0151 HHCP-SERV OF PT,EA 15 MIN: HCPCS

## 2021-10-05 ENCOUNTER — TELEPHONE (OUTPATIENT)
Dept: PHYSICAL THERAPY | Facility: CLINIC | Age: 66
End: 2021-10-05

## 2021-10-05 RX ORDER — ASPIRIN 81 MG/1
81 TABLET ORAL DAILY
Qty: 60 TABLET | Refills: 0 | Status: SHIPPED | OUTPATIENT
Start: 2021-10-05 | End: 2021-11-04

## 2021-10-08 ENCOUNTER — TREATMENT (OUTPATIENT)
Dept: PHYSICAL THERAPY | Facility: CLINIC | Age: 66
End: 2021-10-08

## 2021-10-08 DIAGNOSIS — Z96.641 STATUS POST TOTAL HIP REPLACEMENT, RIGHT: Primary | ICD-10-CM

## 2021-10-08 DIAGNOSIS — R53.1 STRENGTH LOSS OF: ICD-10-CM

## 2021-10-08 DIAGNOSIS — R26.9 ABNORMAL GAIT: ICD-10-CM

## 2021-10-08 PROCEDURE — 97162 PT EVAL MOD COMPLEX 30 MIN: CPT | Performed by: PHYSICAL THERAPIST

## 2021-10-08 PROCEDURE — 97110 THERAPEUTIC EXERCISES: CPT | Performed by: PHYSICAL THERAPIST

## 2021-10-08 NOTE — PROGRESS NOTES
Physical Therapy Initial Evaluation and Plan of Care      Patient: Fariba Gauthier   : 1955  Diagnosis/ICD-10 Code:  Status post total hip replacement, right [Z96.641]  Referring practitioner: CHI Vergara  Date of Initial Visit: 10/8/2021  Today's Date: 10/8/2021  Patient seen for 1 sessions           Subjective: Fariba reports undergoing R BEHZAD, posterior technique, on 2021.  She did not stay in the hospital post surgery and went to her brother's, Antonio, house for 4 days and nights and went back to her house on the fifth day.  She resides in a one story ranch that does not have steps to enter or have a basement.  Currently pain is limited to certain activities like moving in bed or lying on her R side.  Sitting, for 30-60 minutes, will cause her to experience stiffness/discomfort when taking her first step to walk which loosens up quickly.  She reports no problems with parasthesia.     Fariba's goals with physical therapy:  Less pain/stiffness and further improvement with normal ADLs  PMH:  R ankle reconstruction due to multiple injuries, HTN controlled with medication  SH: single, she is  for the [employer].  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  She enjoys cooking, dogs    Objective     Observation: She arrived in the clinic ambulating independently without an assistive device.  Her gait pattern is abnormal, due to her R ankle issue.  During the gait cycle she places weight on the lateral portion of the R foot.      Supine AROM  Hips, standing,   Flexion: L greater than 90 degrees and R up to 90 degrees  Abduction: L WFL, R minimal loss  Extension:  L WFL, R minimal loss    DTRs: Patellar, B, WFL, and Stacy's B WFL  Sensation: Dermatomes L1-S2 were intact B  MMT/myotomes: Hip flexion B 4+ to 5/5, knee extension, B 5/5, ankle dorsiflexion B 4+/5, EHL, B 4+/5, knee flexion B 4+ to 5/5 and hip extension B 5/5 and ankle eversion L 5/5, R 3+/5    Functional Outcome  Score: LEFS, 78/80=98% or a 2% disability    Treatment  1.  In long sitting, R ankle eversion, yellow theraband, 10 x 2  2. Patient education:  I reviewed her home health exercises/precautions for her R hip adding the ankle eversion PRE.  I issued yellow, red and green theraband so she can progress resistance when appropriate.     Assessment & Plan     Assessment  Impairments: abnormal gait, abnormal or restricted ROM, activity intolerance, impaired physical strength, lacks appropriate home exercise program, pain with function and safety issue  Assessment details: Fariba Gauthier is a 66 y.o. year-old female referred to physical therapy for R BEHZAD, posterior technique. She presents with a evolving clinical presentation.  She has comorbidities to include chronic R ankle weakness effecting kinetic chain function with gait and other weight bearing activities.  She has no known personal factors  that may affect her progress in the plan of care.  Signs and symptoms are consistent with physical therapy diagnosis of R BEHZAD, posterior technique, abnormal gait, loss of motion/strength and she will require education for self care. .   Prognosis: good  Prognosis details: Fariba is doing well generally.  She has some loss of motion in the R hip which is to be expected at this point in the rehab process.  The R ankle dysfunction/weakness is huge and has a great effect on function with the entire kinetic chain.  Functional Limitations: carrying objects, walking, uncomfortable because of pain, moving in bed, stooping and unable to perform repetitive tasks  Goals  Plan Goals: STGs to be met in 4 weeks  1. Fariba is able to perform 30 repetitions of yellow theraband ankle eversion PRE without rest.  2. She is advanced to red theraband for the R ankle.    3. Ergonomic movement strategies for her bed are introduced.    LTGs to be met in 12 weeks  1. Fariba has advanced to green theraband for ankle eversion PRE.  2. She is able to move in bed  without discomfort.  3. Fariba is independent with a HEP and education for self care to include all precautions for the R hip.    Plan  Therapy options: will be seen for skilled physical therapy services  Planned therapy interventions: manual therapy, ADL retraining, balance/weight-bearing training, body mechanics training, flexibility, functional ROM exercises, gait training, home exercise program, stretching, strengthening, soft tissue mobilization, neuromuscular re-education, postural training, therapeutic activities and transfer training  Frequency: 1-2 x per week.  Duration in weeks: 12  Treatment plan discussed with: patient        Timed:  Manual Therapy:         mins  59689;  Therapeutic Exercise:    10     mins  00186;     Neuromuscular Osman:        mins  01661;    Therapeutic Activity:          mins  71740;     Gait Training:           mins  56923;     Ultrasound:          mins  56819;    Electrical Stimulation:         mins  58460 ( );  Iontophoresis         mins 43306  Dry Needling        mins      Untimed:  Electrical Stimulation:         mins  91317 ( );  Mechanical Traction:         mins  46099;     Timed Treatment:   10   mins   Total Treatment:     45   mins    PT SIGNATURE: [facility] PT   DATE TREATMENT INITIATED: 10/8/2021    Initial Certification  Certification Period: 1/6/2022  I certify that the therapy services are furnished while this patient is under my care.  The services outlined above are required by this patient, and will be reviewed every 90 days.     PHYSICIAN:       DATE:     Please sign and return via fax to [phone] Thank you, [facility].

## 2021-10-14 ENCOUNTER — TREATMENT (OUTPATIENT)
Dept: PHYSICAL THERAPY | Facility: CLINIC | Age: 66
End: 2021-10-14

## 2021-10-14 DIAGNOSIS — R26.9 ABNORMAL GAIT: ICD-10-CM

## 2021-10-14 DIAGNOSIS — R53.1 STRENGTH LOSS OF: ICD-10-CM

## 2021-10-14 DIAGNOSIS — Z96.641 STATUS POST TOTAL HIP REPLACEMENT, RIGHT: Primary | ICD-10-CM

## 2021-10-14 PROCEDURE — 97110 THERAPEUTIC EXERCISES: CPT | Performed by: PHYSICAL THERAPIST

## 2021-10-14 PROCEDURE — 97112 NEUROMUSCULAR REEDUCATION: CPT | Performed by: PHYSICAL THERAPIST

## 2021-10-14 NOTE — PROGRESS NOTES
Physical Therapy Daily Progress Note    Patient: Fariba Gauthier   : 1955  Diagnosis/ICD-10 Code:  Status post total hip replacement, right [Z96.641]  Referring practitioner: CHI Guy  Date of Initial Visit: Type: THERAPY  Noted: 10/8/2021  Today's Date: 10/14/2021  Patient seen for 2 sessions           Subjective I have been very busy today walking a lot without cane and feeling OK     Objective   MAT exercise   Ankle pumps x10  Heel slides with rope x 10  Bent knee fall out x10  NEW bridge x 10 with exercise to activate whole glut            Clam x 10 with verbal and tactile cueing to minimize compensation of trunk rotation and isolate glut     STANDING at mirror    Posture work to balance weight thru foot/  Knee extension, hip extension up tall trunk and core activation starting with pelvic floor to activation transverse abominis     Med/ lat weight shift    EDUCATION as to proprioception work   Stance phase anterior posterior weight shift  Verbal cueing for all lower chain mechanics    integration into galt with noted less lateral trunk sway       Assessment/Plan  A:  progressing well with therex and closed chain activities but needs significant verbal cueing  PLAN progress therex and closed chain activities          Timed:    Manual Therapy:    0     mins  03835;  Therapeutic Exercise:    15     mins  49220;     Neuromuscular Osman:    30    mins  88956;    Therapeutic Activity:     0     mins  82078;     Gait Trainin     mins  98381;     Ultrasound:     0     mins  08491;    Electrical Stimulation:    0     mins  22051 ( );  Iontophoresis    0     mins 99406;  Aquatic Therapy    0     mins 60889;  Dry Needling              0     mins    Untimed:  Electrical Stimulation:    0     mins  50655 ( );  Mechanical Traction:    0     mins  31321;     Timed Treatment:   45   mins   Total Treatment:     45   mins  Thea Espinosa, CISCO  Physical Therapist

## 2021-10-29 ENCOUNTER — TELEPHONE (OUTPATIENT)
Dept: PHYSICAL THERAPY | Facility: CLINIC | Age: 66
End: 2021-10-29

## 2021-11-01 ENCOUNTER — TELEPHONE (OUTPATIENT)
Dept: ORTHOPEDIC SURGERY | Facility: CLINIC | Age: 66
End: 2021-11-01

## 2021-11-01 ENCOUNTER — TELEPHONE (OUTPATIENT)
Dept: PHYSICAL THERAPY | Facility: CLINIC | Age: 66
End: 2021-11-01

## 2021-11-01 NOTE — TELEPHONE ENCOUNTER
----- Message from Mely Elsa Currymary Rep sent at 11/1/2021  4:34 PM EDT -----  Regarding: CONFIDENTIAL/BREAK THE GLASS PATIENT  THIS IS AN CONFIDENTIAL PATIENT, I WAS UNABLE TO ACCESS THEIR CHART. ATTEMPTED TO WARM TRANSFER. PATIENT STATES THEY WANT TO DISCONTINUE PHYSICAL THERAPY UNTIL THEY COME IN FOR APPOINTMENT WITH DR.REID FRANKLIN.  SAYS SHE HAS HAD SOME SCHEDULING CONFLICTS WITH THE [facility]. PATIENT CAN BE REACHED [phone] PLEASE ADVISE.

## 2021-11-01 NOTE — TELEPHONE ENCOUNTER
SCHEDULE TIMES CONFLICT WITH PATIENT'S WORK SCHEDULE- PATIENT WOULD LIKE TO CANCEL ALL APPOINTMENTS DUE TO WORK SCHEDULE BEING 8-4:30 AND NO LATER TIMES AVAILABLE. WILL REACH OUT TO DOC. FIRST AND CALL BACK TO CANCEL FUTURE VISITS

## 2021-11-02 NOTE — TELEPHONE ENCOUNTER
I called and spoke with the patient in regards to her questions and concerns.  Patient is status post right total hip replacement.  Patient states that she is progressing well and does not feel like she needs to continue on with physical therapy as she is having problems with him scheduling her visits.  Patient can hold off on going to outpatient physical therapy at this time until she is reevaluated by me next week in the clinic.

## 2021-11-11 ENCOUNTER — OFFICE VISIT (OUTPATIENT)
Dept: ORTHOPEDIC SURGERY | Facility: CLINIC | Age: 66
End: 2021-11-11

## 2021-11-11 VITALS — HEIGHT: 68 IN | WEIGHT: 199 LBS | BODY MASS INDEX: 30.16 KG/M2 | TEMPERATURE: 97.3 F

## 2021-11-11 DIAGNOSIS — R52 PAIN: Primary | ICD-10-CM

## 2021-11-11 DIAGNOSIS — Z96.641 HISTORY OF TOTAL RIGHT HIP ARTHROPLASTY: ICD-10-CM

## 2021-11-11 PROCEDURE — 99024 POSTOP FOLLOW-UP VISIT: CPT | Performed by: NURSE PRACTITIONER

## 2021-11-11 PROCEDURE — 73502 X-RAY EXAM HIP UNI 2-3 VIEWS: CPT | Performed by: NURSE PRACTITIONER

## 2021-11-11 NOTE — PROGRESS NOTES
Fariba Gauthier : 1955 MRN: [id number] DATE: 2021    DIAGNOSIS: 8 week follow up right total hip (Posterior)    SUBJECTIVE:Patient returns today for 8 week follow up of right total hip replacement. Patient reports doing well with no unusual complaints. Appears to be progressing appropriately and is off a cane    OBJECTIVE:   Exam:. The incision is healed. No sign of infection. Range of motion is progressing as expected. The calf is soft and nontender with a negative Homans sign. Strength progressing    DIAGNOSTIC STUDIES  Xrays: 2 views of the right hip (AP pelvis and lateral right hip) were ordered and reviewed for evaluation of recent hip replacement. They demonstrate a well positioned, well aligned hip replacement without complicating factors noted. In comparison with previous films there has been interval implant placement.    ASSESSMENT: 8 week status post right hip replacement.    PLAN: 1) Activity as tolerated   2) Continue hip strengthening exercises    3) Follow up 1 year post-op with repeat Xrays of right hip (2views AP Pelvis      and lateral left hip)    CHI Zavala  2021

## 2022-01-25 ENCOUNTER — TELEPHONE (OUTPATIENT)
Dept: ORTHOPEDIC SURGERY | Facility: CLINIC | Age: 67
End: 2022-01-25

## 2022-01-25 NOTE — TELEPHONE ENCOUNTER
From: Denisha Rome On: 10/01/2021 09:52 AM   To: Ronald Maldonado MD   Priority: Routine   Routing Comments:   Pt discharged from home health services with all goals achieved.  She is scheduled to start outpatient PT services at [facility] on 10/5/21.   Thank you

## 2022-03-10 ENCOUNTER — OFFICE VISIT (OUTPATIENT)
Dept: ORTHOPEDIC SURGERY | Facility: CLINIC | Age: 67
End: 2022-03-10

## 2022-03-10 VITALS — BODY MASS INDEX: 30.01 KG/M2 | WEIGHT: 198 LBS | HEIGHT: 68 IN

## 2022-03-10 DIAGNOSIS — Q66.10 CAVOVARUS DEFORMITY OF FOOT: ICD-10-CM

## 2022-03-10 DIAGNOSIS — M19.079 ARTHRITIS OF ANKLE: Primary | ICD-10-CM

## 2022-03-10 DIAGNOSIS — M25.571 RIGHT ANKLE PAIN, UNSPECIFIED CHRONICITY: ICD-10-CM

## 2022-03-10 DIAGNOSIS — M62.561 RIGHT CALF ATROPHY: ICD-10-CM

## 2022-03-10 PROCEDURE — 99214 OFFICE O/P EST MOD 30 MIN: CPT | Performed by: ORTHOPAEDIC SURGERY

## 2022-03-10 PROCEDURE — 73610 X-RAY EXAM OF ANKLE: CPT | Performed by: ORTHOPAEDIC SURGERY

## 2022-03-10 NOTE — PROGRESS NOTES
"   New Patient Complaint      Patient: Fariba Gauthier  YOB: 1955 66 y.o. female  Medical Record Number: [id number]    Chief Complaints: My ankle hurts    History of Present Illness: Patient reports that she had surgery on her right ankle by Dr. Meier in her youth for what sounds like lateral ankle ligament problem.    She states she has had chronic problems with the ankle with moderate aching pain in the inferolateral aspect of the right ankle and hindfoot where it tends to turn in some.  She has not had any specific new injury and has a lot of discomfort with weather changes.    She also noted about 3 years ago onset of atrophy in her right calf but has not sought treatment for this.  She denies any numbness or tingling in her foot or ankle.    HPI    Allergies:   Allergies   Allergen Reactions   • Morphine And Related Anaphylaxis       Medications:   Current Outpatient Medications on File Prior to Visit   Medication Sig   • acetaminophen (TYLENOL) 650 MG 8 hr tablet Take 650 mg by mouth Every 6 (Six) Hours.   • cetirizine (zyrTEC) 10 MG tablet Take 10 mg by mouth Daily.   • FLUoxetine (PROzac) 20 MG capsule Take 20 mg by mouth Daily.   • lisinopril 20 MG tablet 20 mg, hydroCHLOROthiazide 12.5 MG 12.5 mg Take 1 dose by mouth Daily.   • lisinopril-hydrochlorothiazide (PRINZIDE,ZESTORETIC) 20-12.5 MG per tablet    • metoprolol succinate XL (TOPROL-XL) 100 MG 24 hr tablet Take 100 mg by mouth Daily.   • ondansetron (Zofran) 4 MG tablet Take 1 tablet by mouth Every 8 (Eight) Hours As Needed for Nausea or Vomiting for up to 10 doses.     Current Facility-Administered Medications on File Prior to Visit   Medication   • Chlorhexidine Gluconate Cloth 2 % pads       Past Medical History:   Diagnosis Date   • Anesthesia complication     PT WANTS EVERYONE TO KNOW SHE \"GETS MEAN\" AFTER   • Depression with anxiety    • Hypertension    • Seasonal allergies      Past Surgical History:   Procedure Laterality Date " "  • ANKLE LIGAMENT RECONSTRUCTION     • COLONOSCOPY     • OVARIAN CYST REMOVAL     • TOTAL HIP ARTHROPLASTY Right 9/17/2021    Procedure: TOTAL HIP ARTHROPLASTY;  Surgeon: Ronald Maldondao MD;  Location: [facility];  Service: Orthopedics;  Laterality: Right;     Social History     Occupational History   • Not on file   Tobacco Use   • Smoking status: Current Every Day Smoker     Packs/day: 0.50     Years: 30.00     Pack years: 15.00     Types: Cigarettes   • Smokeless tobacco: Never Used   Vaping Use   • Vaping Use: Never used   Substance and Sexual Activity   • Alcohol use: Yes     Alcohol/week: 8.0 standard drinks     Types: 8 Shots of liquor per week   • Drug use: Not Currently   • Sexual activity: Not on file      Social History     Social History Narrative   • Not on file     Family History   Problem Relation Age of Onset   • Malig Hyperthermia Neg Hx        Review of Systems: 14 point review of systems performed, positive pertinent findings identified in HPI. All remaining systems negative     Review of Systems      Physical Exam:   Vitals:    03/10/22 0920   Weight: 89.8 kg (198 lb)   Height: 172.7 cm (68\")     Physical Exam   Constitutional: pleasant, well developed   Eyes: sclera non icteric  Hearing : adequate for exam  Cardiovascular: palpable pulses in right foot, right calf/ thigh NT without sign of DVT  Respiratoy: breathing unlabored   Neurological: grossly sensate to LT throughout right LE  Psychiatric: oriented with normal mood and affect.   Lymphatic: No palpable popliteal lymphadenopathy right LE  Skin: intact throughout right leg/foot  Musculoskeletal: On exam she has somewhat of a cavovarus alignment to the right hindfoot.  She had moderate tenderness over the anterolateral ligamentous structures with mild anterior drawer but unable to elicit gross sulcus.  She was somewhat weak on resisted eversion and had some discomfort with subtalar as well as tibiotalar motion.    She had 3 cm of calf " atrophy at 36.5 on the right and 39.5 on the left.  Physical Exam  Ortho Exam    Radiology: 3 views of the right ankle ordered evaluate no prior x-rays available for comparison.  There is mild to moderate arthritic change of the right ankle with some overall valgus alignment for the distal tibiofibular joint without tilt of the talus within the mortise.  There is somewhat of a cavus posture to the foot with arthritis of the subtalar joint as well.    Assessment/Plan: 1.  Right ankle arthritis  2.  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  Right calf atrophy.    I had a long discussion with her today regarding treatment options and she has seen Dr. Culp in the past and tried some orthotics which were not of benefit.    Reviewed with her that this is somewhat of a complex case and situation given her multiple areas of pathology.  Given her peroneal weakness and subtalar arthritis she is likely to need to have at least a subtalar arthrodesis but may need subsequent ankle replacement given the arthritic change in her ankle.  Given the complexity of this case I would like to have her see Dr. Yoni Templeton for his evaluation regarding treatment recommendations going forward.  Patient was in agreement with that and was provided with copies of her x-rays as well as Dr. Templeton's name and number.  Referral was placed in epic.    She was fitted with a lateral heel wedge to help improve her alignment until she can see Dr. Templeton..    We had a very pleasant visit and she told me she had appreciated my care and will let me know what she finds out when she sees Dr. Templeton.

## 2022-11-30 ENCOUNTER — TRANSCRIBE ORDERS (OUTPATIENT)
Dept: PHYSICAL THERAPY | Facility: HOSPITAL | Age: 67
End: 2022-11-30

## 2022-11-30 DIAGNOSIS — M25.571 ACUTE RIGHT ANKLE PAIN: Primary | ICD-10-CM

## 2023-01-24 ENCOUNTER — HOSPITAL ENCOUNTER (OUTPATIENT)
Dept: PHYSICAL THERAPY | Facility: HOSPITAL | Age: 68
Setting detail: THERAPIES SERIES
Discharge: HOME | End: 2023-01-24
Payer: COMMERCIAL

## 2023-01-24 DIAGNOSIS — R26.9 GAIT DIFFICULTY: ICD-10-CM

## 2023-01-24 DIAGNOSIS — M62.81 MUSCLE WEAKNESS OF LOWER EXTREMITY: ICD-10-CM

## 2023-01-24 DIAGNOSIS — Z98.890 HISTORY OF ANKLE SURGERY: Primary | ICD-10-CM

## 2023-01-24 DIAGNOSIS — M25.60 RANGE OF MOTION DEFICIT: ICD-10-CM

## 2023-01-24 PROCEDURE — 97110 THERAPEUTIC EXERCISES: CPT

## 2023-01-24 PROCEDURE — 97161 PT EVAL LOW COMPLEX 20 MIN: CPT

## 2023-01-25 NOTE — THERAPY EVALUATION
"    Outpatient Physical Therapy Ortho Initial Evaluation  [facility]     Patient Name: Fariba Gauthier  : 1955  MRN: [id number]  Today's Date: 2023      Visit Date: 2023    Patient Active Problem List   Diagnosis   • Primary osteoarthritis of right hip   • Arthritis of ankle   • Right calf atrophy   • Cavovarus deformity of foot        Past Medical History:   Diagnosis Date   • Anesthesia complication     PT WANTS EVERYONE TO KNOW SHE \"GETS MEAN\" AFTER   • Depression with anxiety    • Hypertension    • Seasonal allergies         Past Surgical History:   Procedure Laterality Date   • ANKLE LIGAMENT RECONSTRUCTION     • COLONOSCOPY     • OVARIAN CYST REMOVAL     • TOTAL HIP ARTHROPLASTY Right 2021    Procedure: TOTAL HIP ARTHROPLASTY;  Surgeon: Ronald Maldonado MD;  Location: [facility] MAIN OR;  Service: Orthopedics;  Laterality: Right;       Visit Dx:     ICD-10-CM ICD-9-CM   1. History of ankle surgery  Z98.890 V45.89   2. Muscle weakness of lower extremity  M62.81 728.87   3. Range of motion deficit  M25.60 719.50   4. Gait difficulty  R26.9 781.2          Patient History     Row Name 23 1500             History    Chief Complaint Balance Problems  -JA      Date Current Problem(s) Began 22  -      Brief Description of Current Complaint R ankle pain, hx surgery (ankle reconstruction for lateral ligament problem?) by Dr Meier in her youth;  chronic problems with the ankle with moderate aching pain in the inferolateral aspect of the right ankle and hindfoot where it tends to turn in some. Is now post-op R cavovarus foot reconstruction, peroneal tendon repair, lateral ankle ligament reconstruction (on 22) by Dr. Martin, has had PT elsewhere then transferred here but got flu and had to cx initial appt and it took to now to get in. States she has been doing some of the exercises but not diligently. Achilles tendon still feels stiff and her balance is not great; states she has " not been cleared to jump or run yet, seejhonny STERLING 2/15. Wants to return to pickleball.  She has decreased sensation in foot, describes change in sensation on bottom of the foot and lateral foot. She thinks her R hip endstage OA compensatory changes prior to BEHZAD last year irritated her ankle and caused worsening of ankle problems.  -JA      Occupation/sports/leisure activities works from home, likes to hike, cook, play pickleball, shoot a basketball every now and then. Is checking out  gyms with pools.  -JA         Pain     Pain Location Foot;Ankle  R  -JA      Pain at Present 0  -JA      Tolerance Time- Standing no limit  -JA      Tolerance Time- Sitting no limit  -JA      Tolerance Time- Walking 5-10  -JA      Tolerance Time- Lying no limit  -JA      Is your sleep disturbed? No  -JA         Fall Risk Assessment    Any falls in the past year: Yes  -LEO      Number of falls reported in the last 12 months 1  -JA      Factors that contributed to the fall: --  fell after D/c from surgery due to nerve block  -JA         Daily Activities    Recommended Referrals Physical Therapy  -LEO      Pt Participated in POC and Goals Yes  -ELO            User Key  (r) = Recorded By, (t) = Taken By, (c) = Cosigned By    Initials Name Provider Type    Shelby Borrego, PT Physical Therapist                 PT Ortho     Row Name 01/24/23 1600       General ROM    RT Lower Ext Rt Hip ABduction;Rt Hip Extension;Rt Hip Flexion;Rt Hip External Rotation;Rt Hip Internal Rotation;Rt Knee Extension/Flexion  -    LT Lower Ext Lt Hip ABduction;Lt Hip Extension;Lt Hip Flexion;Lt Hip External Rotation;Lt Hip Internal Rotation;Lt Knee Extension/Flexion  -LEO    Row Name 01/24/23 1500       Posture/Observations    Posture/Observations Comments incision scars well healed, mild scar tissue palpable  -       General ROM    RT Lower Ext Rt Ankle Dorsiflexion;Rt Ankle Plantarflexion;Rt Ankle Inversion;Rt Ankle Eversion  -    LT Lower Ext Lt Ankle  Dorsiflexion;Lt Ankle Plantarflexion;Lt Ankle Inversion;Lt Ankle Eversion  -JA       Right Lower Ext    Rt Ankle Dorsiflexion AROM 12  10 seated  -JA    Rt Ankle Dorsiflexion PROM 15  -JA    Rt Ankle Plantarflexion AROM 30  -JA    Rt Ankle Plantarflexion PROM 40  -JA    Rt Ankle Inversion AROM 30  -JA    Rt Ankle Inversion PROM 30  -JA    Rt Ankle Eversion AROM 5  -JA    Rt Ankle Eversion PROM 5  -JA       Left Lower Ext    Lt Ankle Dorsiflexion AROM 15  -JA    Lt Ankle Dorsiflexion PROM --  -JA    Lt Ankle Plantarflexion AROM 80  -JA    Lt Ankle Plantarflexion PROM --  -JA    Lt Ankle Inversion AROM 35  -JA    Lt Ankle Inversion PROM --  -JA    Lt Ankle Eversion AROM 28  -JA    Lt Ankle Eversion PROM --  -JA       MMT (Manual Muscle Testing)    Rt Lower Ext Rt Hip Flexion;Rt Hip Extension;Rt Hip ABduction;Rt Hip Internal (Medial) Rotation;Rt Hip External (Lateral) Rotation;Rt Knee Extension;Rt Knee Flexion;Rt Ankle Plantarflexion;Rt Ankle Dorsiflexion;Rt Ankle Subtalar Inversion;Rt Ankle Subtalar Eversion  -JA    Lt Lower Ext Lt Ankle Plantarflexion;Lt Ankle Dorsiflexion;Lt Ankle Subtalar Inversion;Lt Ankle Subtalar Eversion;Lt Hip Flexion;Lt Hip Extension;Lt Hip ABduction;Lt Hip Internal (Medial) Rotation;Lt Hip External (Lateral) Rotation;Lt Knee Extension;Lt Knee Flexion  -JA       MMT Right Lower Ext    Rt Hip Flexion MMT, Gross Movement (4-/5) good minus  -JA    Rt Hip Internal (Medial) Rotation MMT, Gross Movement (3+/5) fair plus  -JA    Rt Hip External (Lateral) Rotation MMT, Gross Movement (3+/5) fair plus  -JA    Rt Knee Extension MMT, Gross Movement (3+/5) fair plus  -JA    Rt Knee Flexion MMT, Gross Movement (4-/5) good minus  -JA    Rt Ankle Plantarflexion MMT, Gross Movement (4-/5) good minus  -JA    Rt Ankle Dorsiflexion MMT, Gross Movement (4-/5) good minus  -JA    Rt Ankle Subtalar Inversion MT, Gross Movement (3+/5) fair plus  -JA    Rt Ankle Subtalar Eversion MMT, Gross Movement (3+/5) fair plus   -LEO    Rt Lower Extremity Comments  the longer the distance the more the stiffness is noted in Achilles  -JA       MMT Left Lower Ext    Lt Hip Flexion MMT, Gross Movement (4+/5) good plus  -JA    Lt Hip Internal (Medial) Rotation MMT, Gross Movement (4/5) good  -JA    Lt Hip External (Lateral) Rotation MMT, Gross Movement (4/5) good  -JA    Lt Knee Extension MMT, Gross Movement (4/5) good  -JA    Lt Knee Flexion MMT, Gross Movement (4/5) good  -JA    Lt Ankle Plantarflexion MMT, Gross Movement (4+/5) good plus  -JA    Lt Ankle Dorsiflexion MMT, Gross Movement (4+/5) good plus  -JA    Lt Ankle Subtalar Inversion MMT, Gross Movement (4+/5) good plus  -JA    Lt Ankle Subtalar Eversion MMT, Gross Movement (4+/5) good plus  -JA    Lt Lower Extremity Comments  --  -LEO       Sensation    Additional Comments touch/pressure  -LEO       Gait/Stairs (Locomotion)    Comment, (Gait/Stairs) asymmetrical stride, decreased heel strike to toe off, (toe off limited by PF ROM limitations), increased weight shift  -LEO          User Key  (r) = Recorded By, (t) = Taken By, (c) = Cosigned By    Initials Name Provider Type    Shelby Borrego, PT Physical Therapist                                   PT OP Goals     Row Name 01/24/23 1600          PT Short Term Goals    STG Date to Achieve 02/24/23  -LEO     STG 1 Patient will be independent and compliant with initial HEP.  -LEO     STG 1 Progress New  -LEO     STG 2 Pt will demonstrate increased R ankle plantarflexion to  -LEO     STG 3 Pt will ambulate 150' with normal heel strike to toe off with symmetrical stride and arsh.  -LEO     STG 4 Pt will perform SLS 25 sec.  -LEO     STG 4 Progress New  -LEO        Long Term Goals    LTG Date to Achieve 03/26/23  -LEO     LTG 1 Pt will be independent with advanced HEP for strengthening to increase functional mobility.  -LEO     LTG 1 Progress New  -LEO     LTG 1 Progress Comments --  -LEO     LTG 2 Pt will demonstrate increased strength in R ankle  to 4+/5 or better to facilitate safe return to previous level of activity including playing pickleball.  -LEO     LTG 2 Progress New  -JA     LTG 3 Pt will score 77/84 on FAAM indicating decrease in perceived functional disability.  -JA     LTG 3 Progress New  -JA     LTG 4 Pt will demonstrate full ankle plantarflexion ROM to facilitate return to PLOF.  -LEO     LTG 4 Progress New  -JA     LTG 5 Pt will ambulate 500' with normal heel strike to toe off with symmetrical stride and arsh.  -LEO     LTG 5 Progress New  -LEO        Time Calculation    PT Goal Re-Cert Due Date 04/25/23  -LEO           User Key  (r) = Recorded By, (t) = Taken By, (c) = Cosigned By    Initials Name Provider Type    Shelby Borrego, PT Physical Therapist                 PT Assessment/Plan     Row Name 01/24/23 1600          PT Assessment    Functional Limitations Impaired gait;Limitation in home management;Limitations in community activities;Limitations in functional capacity and performance;Performance in leisure activities;Performance in self-care ADL;Performance in sport activities  -LEO     Impairments Range of motion;Muscle strength;Sensation;Posture;Poor body mechanics;Pain;Gait;Endurance;Balance  -LEO     Assessment Comments Fariba Gauthier is a 67 y.o. female referred to outpatient physical therapy for evaluation and treatment of right ankle s/p R cavovarus foot reconstruction, peroneal tendon repair, lateral ankle ligament reconstruction (on 8/19/22).  Patient presents with limited R ankle ROM, weakness in R ankle and LE/hip girdle/core as well, impaired gait/functional mobility, unable to participate in recreational sport/work out activity. Signs and symptoms are consistent with referring diagnosis.  Pertinent comorbidities and personal factors that may affect progress include, but are not limited to, hx of prior reconstruction surgery, sedentary job.  This condition is stable. Recommend skilled PT to address functional deficits.  Thank you for this referral.  -     Please refer to paper survey for additional self-reported information Yes  -JA     Rehab Potential Good  -     Patient/caregiver participated in establishment of treatment plan and goals Yes  -     Patient would benefit from skilled therapy intervention Yes  -JA        PT Plan    PT Frequency 1x/week;2x/week  -JA     Predicted Duration of Therapy Intervention (PT) 8-14 visits  -JA     Planned CPT's? PT EVAL LOW COMPLEXITY: 60110;PT RE-EVAL: 81537;PT THER PROC EA 15 MIN: 37368;PT THER ACT EA 15 MIN: 71555;PT MANUAL THERAPY EA 15 MIN: 23283;PT NEUROMUSC RE-EDUCATION EA 15 MIN: 38893;PT GAIT TRAINING EA 15 MIN: 86687;PT HOT OR COLD PACK TREAT MCARE  -     PT Plan Comments review initial HEP  -           User Key  (r) = Recorded By, (t) = Taken By, (c) = Cosigned By    Initials Name Provider Type    Shelby Borrego, PT Physical Therapist                   OP Exercises     Row Name 01/24/23 1600             Subjective Comments    Subjective Comments initial eval  -JA         Total Minutes    61947 - PT Therapeutic Exercise Minutes 23  -JA         Exercise 1    Exercise Name 1 Discussed restoring ankle PF to improve gait. Worked to improve stretching technique and sensing when slack is taken up in tissue to point of mild to moderate stretch - should not have pain reaction but should feel muscle stretch/tension. Also worked on recruiting the targeted muscle(s) during strenghtneing and decreasing compensatory muscle engagement; she demonstrated increased understanding and teachback. Will benefit from reinforcement of technique.  -JA      Time 1 5 min  -JA      Additional Comments next visit begin with Nustep  -JA         Exercise 2    Exercise Name 2 isometric ankle inversion w/ball  -JA      Cueing 2 Verbal;Tactile;Demo  -JA      Reps 2 10  -JA      Time 2 3sec  -JA         Exercise 3    Exercise Name 3 isometric ankle eversion w/ball  -JA      Cueing 3  Tactile;Verbal;Demo  -JA      Reps 3 10  -JA      Time 3 3sec  -JA         Exercise 4    Exercise Name 4 seated gastroc stretch with strap  -JA      Cueing 4 Verbal;Tactile;Demo  -JA      Reps 4 3  -JA      Time 4 20sec  -JA         Exercise 5    Exercise Name 5 standing calf stretch  -JA      Cueing 5 Verbal;Tactile;Demo  -JA      Reps 5 3  -JA      Time 5 20sec  -JA         Exercise 6    Exercise Name 6 seated soleus stretch  -JA      Cueing 6 Verbal;Tactile;Demo  -JA      Reps 6 3  -JA      Time 6 20sec  -JA         Exercise 7    Exercise Name 7 seated stretch into plantarflexion (leg crossed, hands on dorsum of foot)  -JA      Cueing 7 Verbal;Tactile;Demo  -JA      Reps 7 3  -JA      Time 7 20sec  -JA         Exercise 8    Exercise Name 8 manual talocrural mobilization to increase DF/PF ROM  -JA      Time 8 3 min  -JA         Exercise 9    Exercise Name 9 Discussed engaging lower abs, draw in belly button, with all ex's and intermittently throughout the day.  -JA            User Key  (r) = Recorded By, (t) = Taken By, (c) = Cosigned By    Initials Name Provider Type    Shelby Borrego, PT Physical Therapist                              Outcome Measure Options: FAAM  FAARONIT  FAAM: 68/84      Time Calculation:     Start Time: 1500  Stop Time: 1600  Time Calculation (min): 60 min  Timed Charges  85615 - PT Therapeutic Exercise Minutes: 23  Untimed Charges  PT Eval/Re-eval Minutes: 35  Total Minutes  Timed Charges Total Minutes: 23  Untimed Charges Total Minutes: 35   Total Minutes: 58     Therapy Charges for Today     Code Description Service Date Service Provider Modifiers Qty    55236635823 HC PT THER PROC EA 15 MIN 1/24/2023 Shelby Garcia, PT GP 2    44493214467 HC PT EVAL LOW COMPLEXITY 2 1/24/2023 Shelby Garcia, PT GP 1          PT G-Codes  Outcome Measure Options: CHANDRAKANT Garcia PT  1/25/2023

## 2023-01-26 ENCOUNTER — HOSPITAL ENCOUNTER (OUTPATIENT)
Dept: PHYSICAL THERAPY | Facility: HOSPITAL | Age: 68
Setting detail: THERAPIES SERIES
Discharge: HOME | End: 2023-01-26
Payer: COMMERCIAL

## 2023-01-26 DIAGNOSIS — M25.60 RANGE OF MOTION DEFICIT: ICD-10-CM

## 2023-01-26 DIAGNOSIS — R26.9 GAIT DIFFICULTY: ICD-10-CM

## 2023-01-26 DIAGNOSIS — M62.81 MUSCLE WEAKNESS OF LOWER EXTREMITY: ICD-10-CM

## 2023-01-26 DIAGNOSIS — Z98.890 HISTORY OF ANKLE SURGERY: Primary | ICD-10-CM

## 2023-01-26 PROCEDURE — 97110 THERAPEUTIC EXERCISES: CPT

## 2023-01-26 NOTE — THERAPY TREATMENT NOTE
"    Outpatient Physical Therapy Ortho Treatment Note  [facility]     Patient Name: Fariba Gauthier  : 1955  MRN: [id number]  Today's Date: 2023      Visit Date: 2023    Visit Dx:    ICD-10-CM ICD-9-CM   1. History of ankle surgery  Z98.890 V45.89   2. Range of motion deficit  M25.60 719.50   3. Muscle weakness of lower extremity  M62.81 728.87   4. Gait difficulty  R26.9 781.2       Patient Active Problem List   Diagnosis   • Primary osteoarthritis of right hip   • Arthritis of ankle   • Right calf atrophy   • Cavovarus deformity of foot        Past Medical History:   Diagnosis Date   • Anesthesia complication     PT WANTS EVERYONE TO KNOW SHE \"GETS MEAN\" AFTER   • Depression with anxiety    • Hypertension    • Seasonal allergies         Past Surgical History:   Procedure Laterality Date   • ANKLE LIGAMENT RECONSTRUCTION     • COLONOSCOPY     • OVARIAN CYST REMOVAL     • TOTAL HIP ARTHROPLASTY Right 2021    Procedure: TOTAL HIP ARTHROPLASTY;  Surgeon: Ronald Maldonado MD;  Location: [facility];  Service: Orthopedics;  Laterality: Right;                        PT Assessment/Plan     Row Name 23 1620          PT Assessment    Assessment Comments Pt returns for intial follow up after evaluation reporting compliance with HEP. Continued to work on R ankle ROM, hip/ankle strength and balance tasks including SLS, narrow ZANE on foam, tandem gait, resisted sidestepping and monster walk. Pt reporting fatigue and using 1 UE for balance tasks, however good response overall. Pt requires cues for TA engagement throughout as well as erect posture with standing activities.  -CN        PT Plan    PT Plan Comments Assess response to added exercises and consider 3D hip, step up, HR next visit. Update HEP if appropriate.  -CN           User Key  (r) = Recorded By, (t) = Taken By, (c) = Cosigned By    Initials Name Provider Type    Lesly Solano, PT Physical Therapist                   OP " Exercises     Row Name 01/26/23 1500             Subjective Comments    Subjective Comments It is feeling fine today. The exercises are going well at home.  -CN         Subjective Pain    Able to rate subjective pain? yes  -CN      Pre-Treatment Pain Level 0  -CN         Total Minutes    79217 - PT Therapeutic Exercise Minutes 40  -CN         Exercise 1    Exercise Name 1 Nustep, L5  -CN      Cueing 1 Verbal  -CN      Time 1 5 min  -CN         Exercise 4    Exercise Name 4 seated gastroc stretch with strap  -CN      Cueing 4 Verbal;Tactile;Demo  -CN      Reps 4 3  -CN      Time 4 20sec  -CN         Exercise 6    Exercise Name 6 seated soleus stretch  -CN      Cueing 6 Verbal;Tactile;Demo  -CN      Reps 6 3  -CN      Time 6 20sec  -CN         Exercise 7    Exercise Name 7 seated stretch into plantarflexion (leg crossed, hands on dorsum of foot)  -CN      Cueing 7 Verbal;Tactile;Demo  -CN      Reps 7 3  -CN      Time 7 20sec  -CN         Exercise 8    Exercise Name 8 manual talocrural mobilization to increase DF/PF ROM  -CN      Time 8 3 min  -CN         Exercise 9    Exercise Name 9 Bridges with TA  -CN      Cueing 9 Verbal;Demo  -CN      Sets 9 2  -CN      Reps 9 10  -CN      Additional Comments cues for glute engagement  -CN         Exercise 10    Exercise Name 10 Resisted sidestepping  -CN      Cueing 10 Verbal;Demo  -CN      Reps 10 3laps  -CN      Time 10 RTB  -CN         Exercise 11    Exercise Name 11 Sl clamshell  -CN      Cueing 11 Verbal;Demo  -CN      Sets 11 2  -CN      Reps 11 10  -CN         Exercise 12    Exercise Name 12 Monster walk, forward/retro  -CN      Cueing 12 Verbal;Demo  -CN      Reps 12 3laps  -CN         Exercise 13    Exercise Name 13 SLS  -CN      Cueing 13 Verbal;Demo  -CN      Reps 13 2  -CN      Time 13 30 sec  -CN      Additional Comments 1 UE  -CN         Exercise 14    Exercise Name 14 Tandem gait  -CN      Cueing 14 Verbal;Demo  -CN      Reps 14 3 laps  -CN      Additional  Comments 1 UE  -CN         Exercise 15    Exercise Name 15 Narrow ZANE on blue foam  -CN      Cueing 15 Verbal;Demo  -CN      Time 15 1 min  -CN      Additional Comments 1 UE  -CN            User Key  (r) = Recorded By, (t) = Taken By, (c) = Cosigned By    Initials Name Provider Type    Lesly Solano, PT Physical Therapist                                 Therapy Education  Given: HEP, Symptoms/condition management, Pain management  Program: New, Reinforced  How Provided: Verbal, Demonstration  Provided to: Patient  Level of Understanding: Teach back education performed, Verbalized, Demonstrated              Time Calculation:   Start Time: 1532  Stop Time: 1612  Time Calculation (min): 40 min  Timed Charges  98401 - PT Therapeutic Exercise Minutes: 40  Total Minutes  Timed Charges Total Minutes: 40   Total Minutes: 40  Therapy Charges for Today     Code Description Service Date Service Provider Modifiers Qty    [id number]  PT THER PROC EA 15 MIN 1/26/2023 Lesly Hebert, PT GP 3                    Lesly Hebert, CISCO  1/26/2023

## 2023-01-31 ENCOUNTER — HOSPITAL ENCOUNTER (OUTPATIENT)
Dept: PHYSICAL THERAPY | Facility: HOSPITAL | Age: 68
Setting detail: THERAPIES SERIES
Discharge: HOME | End: 2023-01-31
Payer: COMMERCIAL

## 2023-01-31 DIAGNOSIS — M25.60 RANGE OF MOTION DEFICIT: ICD-10-CM

## 2023-01-31 DIAGNOSIS — Z98.890 HISTORY OF ANKLE SURGERY: Primary | ICD-10-CM

## 2023-01-31 DIAGNOSIS — R26.9 GAIT DIFFICULTY: ICD-10-CM

## 2023-01-31 DIAGNOSIS — M62.81 MUSCLE WEAKNESS OF LOWER EXTREMITY: ICD-10-CM

## 2023-01-31 PROCEDURE — 97110 THERAPEUTIC EXERCISES: CPT

## (undated) DEVICE — SUT VIC 0 CT1 36IN J946H

## (undated) DEVICE — GLV SURG SENSICARE PI PF LF 7 GRN STRL

## (undated) DEVICE — GLV SURG PREMIERPRO ORTHO LTX PF SZ7.5 BRN

## (undated) DEVICE — APPL DURAPREP IODOPHOR APL 26ML

## (undated) DEVICE — SUT PDS 1 CT1 36IN Z347H

## (undated) DEVICE — 3M™ IOBAN™ 2 ANTIMICROBIAL INCISE DRAPE 6640EZ: Brand: IOBAN™ 2

## (undated) DEVICE — MAT FLR ABSORBENT LG 4FT 10 2.5FT

## (undated) DEVICE — DRSNG GZ PETROLTM XEROFORM CURAD 1X8IN STRL

## (undated) DEVICE — GLV SURG SENSICARE W/ALOE PF LF 8 STRL

## (undated) DEVICE — SPNG GZ WOVN 4X4IN 12PLY 10/BX STRL

## (undated) DEVICE — PREMIUM WET SKIN PREP TRAY: Brand: MEDLINE INDUSTRIES, INC.

## (undated) DEVICE — GLV SURG BIOGEL LTX PF 7 1/2

## (undated) DEVICE — TOWEL,OR,DSP,ST,BLUE,STD,4/PK,20PK/CS: Brand: MEDLINE

## (undated) DEVICE — SOL NACL 0.9PCT 100ML SGL

## (undated) DEVICE — SYR LUERLOK 30CC

## (undated) DEVICE — SYS CLS SKIN PREMIERPRO EXOFINFUSION 22CM

## (undated) DEVICE — 3M™ IOBAN™ 2 ANTIMICROBIAL INCISE DRAPE 6650EZ: Brand: IOBAN™ 2

## (undated) DEVICE — PREP SOL POVIDONE/IODINE BT 4OZ

## (undated) DEVICE — GLV SURG SENSICARE PI MIC PF SZ7 LF STRL

## (undated) DEVICE — PENCL E/S ULTRAVAC TELESCP NOSE HOLSTR 10FT

## (undated) DEVICE — PK HIP TOTL 40

## (undated) DEVICE — TRAP FLD MINIVAC MEGADYNE 100ML

## (undated) DEVICE — SUT VIC 1 CT1 36IN J947H

## (undated) DEVICE — ANTIBACTERIAL UNDYED BRAIDED (POLYGLACTIN 910), SYNTHETIC ABSORBABLE SUTURE: Brand: COATED VICRYL

## (undated) DEVICE — HANDPIECE SET WITH COAXIAL HIGH FLOW TIP AND SUCTION TUBE: Brand: INTERPULSE